# Patient Record
Sex: FEMALE | Race: WHITE | NOT HISPANIC OR LATINO | Employment: PART TIME | URBAN - METROPOLITAN AREA
[De-identification: names, ages, dates, MRNs, and addresses within clinical notes are randomized per-mention and may not be internally consistent; named-entity substitution may affect disease eponyms.]

---
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
## 2019-07-22 ENCOUNTER — TELEPHONE (OUTPATIENT)
Dept: MEDICAL GROUP | Facility: MEDICAL CENTER | Age: 19
End: 2019-07-22

## 2019-07-22 DIAGNOSIS — E53.8 B12 DEFICIENCY: ICD-10-CM

## 2019-07-22 DIAGNOSIS — Z13.220 SCREENING FOR HYPERLIPIDEMIA: ICD-10-CM

## 2019-07-22 DIAGNOSIS — Z13.21 ENCOUNTER FOR VITAMIN DEFICIENCY SCREENING: ICD-10-CM

## 2019-07-22 DIAGNOSIS — D75.89 MACROCYTOSIS: ICD-10-CM

## 2019-07-22 DIAGNOSIS — Z13.89 SCREENING FOR MULTIPLE CONDITIONS: ICD-10-CM

## 2019-10-31 ENCOUNTER — OFFICE VISIT (OUTPATIENT)
Dept: MEDICAL GROUP | Facility: MEDICAL CENTER | Age: 19
End: 2019-10-31
Payer: COMMERCIAL

## 2019-10-31 VITALS
HEART RATE: 105 BPM | SYSTOLIC BLOOD PRESSURE: 102 MMHG | HEIGHT: 67 IN | DIASTOLIC BLOOD PRESSURE: 60 MMHG | OXYGEN SATURATION: 95 % | TEMPERATURE: 98.4 F | BODY MASS INDEX: 21.19 KG/M2 | WEIGHT: 135 LBS

## 2019-10-31 DIAGNOSIS — G43.109 MIGRAINE WITH AURA AND WITHOUT STATUS MIGRAINOSUS, NOT INTRACTABLE: ICD-10-CM

## 2019-10-31 DIAGNOSIS — F64.9 GENDER DYSPHORIA: ICD-10-CM

## 2019-10-31 DIAGNOSIS — N63.0 BREAST NODULE: ICD-10-CM

## 2019-10-31 PROCEDURE — 99214 OFFICE O/P EST MOD 30 MIN: CPT | Performed by: NURSE PRACTITIONER

## 2019-10-31 RX ORDER — SUMATRIPTAN 25 MG/1
25-50 TABLET, FILM COATED ORAL
Qty: 10 TAB | Refills: 1 | Status: SHIPPED | OUTPATIENT
Start: 2019-10-31 | End: 2022-07-13

## 2019-10-31 ASSESSMENT — PATIENT HEALTH QUESTIONNAIRE - PHQ9: CLINICAL INTERPRETATION OF PHQ2 SCORE: 0

## 2019-10-31 NOTE — PROGRESS NOTES
"  Subjective:     Rosalie Weber is a 19 y.o. female who presents with left breast nodule.    HPI:   Seen in f/u for left breast nodule.  Noted 2 mo ago.  No change in size.  Sl tender.  Pat gm wiht breast cancer.  Sister with unknown type of lump.  No nipple dg.    She is getting migraines 2-3 x/yr.  It is getting more freq.  Aura with vision fractures occurring right before pain starts.  That has always been there.    She is requesting referral to psychiatry to discuss gender dysphoria.  She is not on meds at this time.     Patient Active Problem List    Diagnosis Date Noted   • Chronic nonseasonal allergic rhinitis due to pollen    • Cholelithiasis    • B12 deficiency    • Migraine syndrome    • Depression with anxiety    • Vasovagal syncope 08/03/2016       Current medicines (including changes today)  Current Outpatient Medications   Medication Sig Dispense Refill   • SUMAtriptan (IMITREX) 25 MG Tab tablet Take 1-2 Tabs by mouth Once PRN for Migraine for up to 1 dose. 10 Tab 1   • Cyanocobalamin (VITAMIN B 12 PO) Take  by mouth.       No current facility-administered medications for this visit.        No Known Allergies    ROS  Constitutional: Negative. Negative for fever, chills, weight loss, malaise/fatigue and diaphoresis.   HENT: Negative. Negative for hearing loss, ear pain, nosebleeds, congestion, sore throat, neck pain, tinnitus and ear discharge.   Respiratory: Negative. Negative for cough, hemoptysis, sputum production, shortness of breath, wheezing and stridor.   Cardiovascular: Negative. Negative for chest pain, palpitations, orthopnea, claudication, leg swelling and PND.          Objective:     /60 (BP Location: Right arm, Patient Position: Sitting)   Pulse (!) 105   Temp 36.9 °C (98.4 °F) (Temporal)   Ht 1.702 m (5' 7\")   Wt 61.2 kg (135 lb)   SpO2 95%  Body mass index is 21.14 kg/m².    Physical Exam:  Vitals reviewed.  Constitutional: Oriented to person, place, and time. appears " well-developed and well-nourished. No distress.   Cardiovascular: Normal rate, regular rhythm, normal heart sounds and intact distal pulses. Exam reveals no gallop and no friction rub. No murmur heard. No carotid bruits.   Pulmonary/Chest: Effort normal and breath sounds normal. No stridor. No respiratory distress. no wheezes or rales. exhibits no tenderness.   Musculoskeletal: Normal range of motion. exhibits no edema. rudy pedal pulses 2+.  Lymphadenopathy: No cervical or supraclavicular adenopathy.   Neurological: Alert and oriented to person, place, and time. exhibits normal muscle tone.  Skin: Skin is warm and dry. No diaphoresis.   Psychiatric: Normal mood and affect. Behavior is normal.   rudy breast exam::  Equal in size.  No nipple dg.  Firm mildly tender movable nodule noted at 1300 left breast     Assessment and Plan:     The following treatment plan was discussed:    1. Breast nodule  US-BREAST LIMITED-LEFT    left breast nodule at 1300.  do breast us.  f/u with pt with results.    2. Migraine with aura and without status migrainosus, not intractable  SUMAtriptan (IMITREX) 25 MG Tab tablet    MR-BRAIN-W/O    try imitrex.  she will do MRI brain d/t aura if $$.  f/u if sx worsen or med not helping sx   3. Gender dysphoria  REFERRAL TO PSYCHIATRY    refer psychiatry         Followup: Return if symptoms worsen or fail to improve.

## 2020-10-16 ENCOUNTER — HOSPITAL ENCOUNTER (OUTPATIENT)
Dept: LAB | Facility: MEDICAL CENTER | Age: 20
End: 2020-10-16
Attending: FAMILY MEDICINE
Payer: COMMERCIAL

## 2020-10-16 LAB
ALBUMIN SERPL BCP-MCNC: 4.9 G/DL (ref 3.2–4.9)
ALBUMIN/GLOB SERPL: 1.5 G/DL
ALP SERPL-CCNC: 57 U/L (ref 30–99)
ALT SERPL-CCNC: 10 U/L (ref 2–50)
ANION GAP SERPL CALC-SCNC: 10 MMOL/L (ref 7–16)
AST SERPL-CCNC: 14 U/L (ref 12–45)
BASOPHILS # BLD AUTO: 0.4 % (ref 0–1.8)
BASOPHILS # BLD: 0.02 K/UL (ref 0–0.12)
BILIRUB SERPL-MCNC: 0.7 MG/DL (ref 0.1–1.5)
BUN SERPL-MCNC: 6 MG/DL (ref 8–22)
CALCIUM SERPL-MCNC: 9.6 MG/DL (ref 8.5–10.5)
CHLORIDE SERPL-SCNC: 102 MMOL/L (ref 96–112)
CHOLEST SERPL-MCNC: 142 MG/DL (ref 100–199)
CO2 SERPL-SCNC: 23 MMOL/L (ref 20–33)
CREAT SERPL-MCNC: 0.63 MG/DL (ref 0.5–1.4)
EOSINOPHIL # BLD AUTO: 0.16 K/UL (ref 0–0.51)
EOSINOPHIL NFR BLD: 3 % (ref 0–6.9)
ERYTHROCYTE [DISTWIDTH] IN BLOOD BY AUTOMATED COUNT: 37.5 FL (ref 35.9–50)
GLOBULIN SER CALC-MCNC: 3.3 G/DL (ref 1.9–3.5)
GLUCOSE SERPL-MCNC: 88 MG/DL (ref 65–99)
HCT VFR BLD AUTO: 44.1 % (ref 37–47)
HDLC SERPL-MCNC: 49 MG/DL
HGB BLD-MCNC: 14.8 G/DL (ref 12–16)
IMM GRANULOCYTES # BLD AUTO: 0.02 K/UL (ref 0–0.11)
IMM GRANULOCYTES NFR BLD AUTO: 0.4 % (ref 0–0.9)
IRON SATN MFR SERPL: 61 % (ref 15–55)
IRON SERPL-MCNC: 207 UG/DL (ref 40–170)
LDLC SERPL CALC-MCNC: 81 MG/DL
LYMPHOCYTES # BLD AUTO: 1.27 K/UL (ref 1–4.8)
LYMPHOCYTES NFR BLD: 23.6 % (ref 22–41)
MCH RBC QN AUTO: 30.3 PG (ref 27–33)
MCHC RBC AUTO-ENTMCNC: 33.6 G/DL (ref 33.6–35)
MCV RBC AUTO: 90.4 FL (ref 81.4–97.8)
MONOCYTES # BLD AUTO: 0.48 K/UL (ref 0–0.85)
MONOCYTES NFR BLD AUTO: 8.9 % (ref 0–13.4)
NEUTROPHILS # BLD AUTO: 3.42 K/UL (ref 2–7.15)
NEUTROPHILS NFR BLD: 63.7 % (ref 44–72)
NRBC # BLD AUTO: 0 K/UL
NRBC BLD-RTO: 0 /100 WBC
PLATELET # BLD AUTO: 213 K/UL (ref 164–446)
PMV BLD AUTO: 10.1 FL (ref 9–12.9)
POTASSIUM SERPL-SCNC: 3.9 MMOL/L (ref 3.6–5.5)
PROT SERPL-MCNC: 8.2 G/DL (ref 6–8.2)
RBC # BLD AUTO: 4.88 M/UL (ref 4.2–5.4)
SODIUM SERPL-SCNC: 135 MMOL/L (ref 135–145)
TIBC SERPL-MCNC: 338 UG/DL (ref 250–450)
TRIGL SERPL-MCNC: 60 MG/DL (ref 0–149)
UIBC SERPL-MCNC: 131 UG/DL (ref 110–370)
WBC # BLD AUTO: 5.4 K/UL (ref 4.8–10.8)

## 2020-10-16 PROCEDURE — 84144 ASSAY OF PROGESTERONE: CPT

## 2020-10-16 PROCEDURE — 84403 ASSAY OF TOTAL TESTOSTERONE: CPT

## 2020-10-16 PROCEDURE — 83540 ASSAY OF IRON: CPT

## 2020-10-16 PROCEDURE — 83036 HEMOGLOBIN GLYCOSYLATED A1C: CPT

## 2020-10-16 PROCEDURE — 82607 VITAMIN B-12: CPT

## 2020-10-16 PROCEDURE — 84402 ASSAY OF FREE TESTOSTERONE: CPT

## 2020-10-16 PROCEDURE — 82670 ASSAY OF TOTAL ESTRADIOL: CPT

## 2020-10-16 PROCEDURE — 83550 IRON BINDING TEST: CPT

## 2020-10-16 PROCEDURE — 82306 VITAMIN D 25 HYDROXY: CPT

## 2020-10-16 PROCEDURE — 36415 COLL VENOUS BLD VENIPUNCTURE: CPT

## 2020-10-16 PROCEDURE — 84270 ASSAY OF SEX HORMONE GLOBUL: CPT

## 2020-10-16 PROCEDURE — 80061 LIPID PANEL: CPT

## 2020-10-16 PROCEDURE — 80053 COMPREHEN METABOLIC PANEL: CPT

## 2020-10-16 PROCEDURE — 84443 ASSAY THYROID STIM HORMONE: CPT

## 2020-10-16 PROCEDURE — 85025 COMPLETE CBC W/AUTO DIFF WBC: CPT

## 2020-10-17 LAB
25(OH)D3 SERPL-MCNC: 16 NG/ML (ref 30–100)
EST. AVERAGE GLUCOSE BLD GHB EST-MCNC: 97 MG/DL
ESTRADIOL SERPL-MCNC: 116 PG/ML
HBA1C MFR BLD: 5 % (ref 0–5.6)
PROGEST SERPL-MCNC: 14.4 NG/ML
TSH SERPL DL<=0.005 MIU/L-ACNC: 1.24 UIU/ML (ref 0.38–5.33)
VIT B12 SERPL-MCNC: 1069 PG/ML (ref 211–911)

## 2020-10-22 LAB
SHBG SERPL-SCNC: 28 NMOL/L (ref 30–135)
TESTOST FREE SERPL-MCNC: 4.5 PG/ML (ref 0.8–7.4)
TESTOST SERPL-MCNC: 25 NG/DL (ref 9–55)

## 2021-02-23 ENCOUNTER — HOSPITAL ENCOUNTER (OUTPATIENT)
Dept: LAB | Facility: MEDICAL CENTER | Age: 21
End: 2021-02-23
Attending: FAMILY MEDICINE
Payer: COMMERCIAL

## 2021-02-23 LAB
25(OH)D3 SERPL-MCNC: 45 NG/ML (ref 30–100)
ALBUMIN SERPL BCP-MCNC: 4.5 G/DL (ref 3.2–4.9)
ALBUMIN/GLOB SERPL: 1.5 G/DL
ALP SERPL-CCNC: 60 U/L (ref 30–99)
ALT SERPL-CCNC: 7 U/L (ref 2–50)
ANION GAP SERPL CALC-SCNC: 10 MMOL/L (ref 7–16)
AST SERPL-CCNC: 15 U/L (ref 12–45)
BILIRUB SERPL-MCNC: 0.5 MG/DL (ref 0.1–1.5)
BUN SERPL-MCNC: 5 MG/DL (ref 8–22)
CALCIUM SERPL-MCNC: 9.5 MG/DL (ref 8.5–10.5)
CHLORIDE SERPL-SCNC: 103 MMOL/L (ref 96–112)
CO2 SERPL-SCNC: 27 MMOL/L (ref 20–33)
CREAT SERPL-MCNC: 0.71 MG/DL (ref 0.5–1.4)
ERYTHROCYTE [DISTWIDTH] IN BLOOD BY AUTOMATED COUNT: 39 FL (ref 35.9–50)
ESTRADIOL SERPL-MCNC: 21.4 PG/ML
GLOBULIN SER CALC-MCNC: 3 G/DL (ref 1.9–3.5)
GLUCOSE SERPL-MCNC: 89 MG/DL (ref 65–99)
HCT VFR BLD AUTO: 47.2 % (ref 37–47)
HGB BLD-MCNC: 15.6 G/DL (ref 12–16)
IRON SATN MFR SERPL: 22 % (ref 15–55)
IRON SERPL-MCNC: 80 UG/DL (ref 40–170)
MCH RBC QN AUTO: 29.8 PG (ref 27–33)
MCHC RBC AUTO-ENTMCNC: 33.1 G/DL (ref 33.6–35)
MCV RBC AUTO: 90.1 FL (ref 81.4–97.8)
PLATELET # BLD AUTO: 231 K/UL (ref 164–446)
PMV BLD AUTO: 9.6 FL (ref 9–12.9)
POTASSIUM SERPL-SCNC: 4.1 MMOL/L (ref 3.6–5.5)
PROT SERPL-MCNC: 7.5 G/DL (ref 6–8.2)
RBC # BLD AUTO: 5.24 M/UL (ref 4.2–5.4)
SODIUM SERPL-SCNC: 140 MMOL/L (ref 135–145)
TESTOST SERPL-MCNC: 517 NG/DL (ref 9–75)
TIBC SERPL-MCNC: 356 UG/DL (ref 250–450)
UIBC SERPL-MCNC: 276 UG/DL (ref 110–370)
WBC # BLD AUTO: 4.5 K/UL (ref 4.8–10.8)

## 2021-02-23 PROCEDURE — 36415 COLL VENOUS BLD VENIPUNCTURE: CPT

## 2021-02-23 PROCEDURE — 83540 ASSAY OF IRON: CPT

## 2021-02-23 PROCEDURE — 82670 ASSAY OF TOTAL ESTRADIOL: CPT

## 2021-02-23 PROCEDURE — 84403 ASSAY OF TOTAL TESTOSTERONE: CPT

## 2021-02-23 PROCEDURE — 83550 IRON BINDING TEST: CPT

## 2021-02-23 PROCEDURE — 85027 COMPLETE CBC AUTOMATED: CPT

## 2021-02-23 PROCEDURE — 82306 VITAMIN D 25 HYDROXY: CPT

## 2021-02-23 PROCEDURE — 80053 COMPREHEN METABOLIC PANEL: CPT

## 2021-10-09 ENCOUNTER — OFFICE VISIT (OUTPATIENT)
Dept: URGENT CARE | Facility: PHYSICIAN GROUP | Age: 21
End: 2021-10-09
Payer: COMMERCIAL

## 2021-10-09 VITALS
DIASTOLIC BLOOD PRESSURE: 70 MMHG | RESPIRATION RATE: 12 BRPM | BODY MASS INDEX: 22.76 KG/M2 | HEART RATE: 77 BPM | TEMPERATURE: 98.9 F | SYSTOLIC BLOOD PRESSURE: 114 MMHG | WEIGHT: 145 LBS | HEIGHT: 67 IN | OXYGEN SATURATION: 98 %

## 2021-10-09 DIAGNOSIS — L08.9 INFECTED FINGER: ICD-10-CM

## 2021-10-09 PROCEDURE — 99213 OFFICE O/P EST LOW 20 MIN: CPT | Performed by: PHYSICIAN ASSISTANT

## 2021-10-09 RX ORDER — ERGOCALCIFEROL 1.25 MG/1
CAPSULE ORAL
COMMUNITY
Start: 2021-09-10 | End: 2022-07-13

## 2021-10-09 RX ORDER — SULFAMETHOXAZOLE AND TRIMETHOPRIM 800; 160 MG/1; MG/1
1 TABLET ORAL 2 TIMES DAILY
Qty: 14 TABLET | Refills: 0 | Status: SHIPPED | OUTPATIENT
Start: 2021-10-09 | End: 2021-10-16

## 2021-10-09 ASSESSMENT — ENCOUNTER SYMPTOMS
BLURRED VISION: 0
SENSORY CHANGE: 0
CHILLS: 0
NAUSEA: 0
FEVER: 0
VOMITING: 0
SORE THROAT: 0
PALPITATIONS: 0
SHORTNESS OF BREATH: 0
TINGLING: 0

## 2021-10-09 ASSESSMENT — FIBROSIS 4 INDEX: FIB4 SCORE: 0.56

## 2021-10-09 NOTE — PROGRESS NOTES
Subjective     Rosalie Tijerina is a 21 y.o. female who presents with Hand Problem (x1week, right hand middle finger infection, puss , painfully )    HPI:  Rosalie Tijerina is a 21 y.o. female who presents today for evaluation of a possible infection of her right middle finger.  Patient reports that she had areas of discomfort 1 week ago.  It has been draining purulent material and has become very tender to touch.  She is also noted some mild overlying redness and swelling.  She tried keeping it clean with alcohol swabs but has not tried much else for it.  She denies any numbness or tingling.  Denies any injury.  No fever/chills.      Review of Systems   Constitutional: Negative for chills and fever.   HENT: Negative for sore throat.    Eyes: Negative for blurred vision.   Respiratory: Negative for shortness of breath.    Cardiovascular: Negative for chest pain and palpitations.   Gastrointestinal: Negative for nausea and vomiting.   Musculoskeletal: Negative for joint pain.   Skin:        Infected right finger   Neurological: Negative for tingling and sensory change.         PMH:  has a past medical history of B12 deficiency, Cholelithiasis, Chronic nonseasonal allergic rhinitis due to pollen, Depression with anxiety, Migraine syndrome, and Vasovagal syncope (8/3/2016).  MEDS:   Current Outpatient Medications:   •  SUMAtriptan (IMITREX) 25 MG Tab tablet, Take 1-2 Tabs by mouth Once PRN for Migraine for up to 1 dose., Disp: 10 Tab, Rfl: 1  •  Cyanocobalamin (VITAMIN B 12 PO), Take  by mouth., Disp: , Rfl:   •  vitamin D2, Ergocalciferol, (DRISDOL) 1.25 MG (49305 UT) Cap capsule, , Disp: , Rfl:   ALLERGIES: No Known Allergies  SURGHX:   Past Surgical History:   Procedure Laterality Date   • APPENDECTOMY  02/2009     SOCHX:  reports that she has never smoked. She has never used smokeless tobacco. She reports current alcohol use. She reports that she does not use drugs.  FH: Family  "history was reviewed, no pertinent findings to report      Objective     /70 (BP Location: Right arm, Patient Position: Sitting, BP Cuff Size: Adult)   Pulse 77   Temp 37.2 °C (98.9 °F) (Temporal)   Resp 12   Ht 1.702 m (5' 7\")   Wt 65.8 kg (145 lb)   SpO2 98%   BMI 22.71 kg/m²      Physical Exam  Constitutional:       Appearance: She is well-developed.   HENT:      Head: Normocephalic and atraumatic.      Right Ear: External ear normal.      Left Ear: External ear normal.   Eyes:      Conjunctiva/sclera: Conjunctivae normal.      Pupils: Pupils are equal, round, and reactive to light.   Cardiovascular:      Rate and Rhythm: Normal rate and regular rhythm.      Heart sounds: Normal heart sounds. No murmur heard.     Pulmonary:      Effort: Pulmonary effort is normal.      Breath sounds: Normal breath sounds. No wheezing.   Musculoskeletal:      Comments: Right middle finger: Ulnar aspect of nailbed exhibits overlying erythema, soft tissue swelling, and some purulent discharge noted.  It is tender to palpation.  No fluctuance.  No decreased range of motion of the affected digit.  Distal neurovascular intact.  Cap refill less than 2 seconds.   Skin:     General: Skin is warm and dry.      Capillary Refill: Capillary refill takes less than 2 seconds.   Neurological:      Mental Status: She is alert and oriented to person, place, and time.   Psychiatric:         Behavior: Behavior normal.         Judgment: Judgment normal.           Assessment & Plan     1. Infected finger  - sulfamethoxazole-trimethoprim (BACTRIM DS) 800-160 MG tablet; Take 1 Tablet by mouth 2 times a day for 7 days.  Dispense: 14 Tablet; Refill: 0  No abscess noted that is amenable to drainage.  Patient will be started on antibiotics.  She was also advised to do soaks with warm water and Epson salt multiple times per day.  The wound was cleaned in the office and a dressing of Polysporin and Band-Aid was applied.  It was recommended that " she keep the area covered until the infection starts to clear up a bit and it is no longer draining purulent material.  If no significant improvement after 3 to 4 days being on antibiotics or if symptom significantly worsen during this time she should return to the urgent care for reevaluation.                Differential Diagnosis, natural history, and supportive care discussed. Return to the Urgent Care or follow up with your PCP if symptoms fail to resolve, or for any new or worsening symptoms. Emergency room precautions discussed. Patient and/or family appears understanding of information.

## 2022-07-01 ENCOUNTER — TELEPHONE (OUTPATIENT)
Dept: SCHEDULING | Facility: IMAGING CENTER | Age: 22
End: 2022-07-01

## 2022-07-13 ENCOUNTER — OFFICE VISIT (OUTPATIENT)
Dept: MEDICAL GROUP | Facility: MEDICAL CENTER | Age: 22
End: 2022-07-13
Payer: COMMERCIAL

## 2022-07-13 VITALS
OXYGEN SATURATION: 95 % | TEMPERATURE: 98.8 F | HEIGHT: 67 IN | HEART RATE: 61 BPM | SYSTOLIC BLOOD PRESSURE: 108 MMHG | DIASTOLIC BLOOD PRESSURE: 72 MMHG | BODY MASS INDEX: 21.72 KG/M2 | WEIGHT: 138.4 LBS

## 2022-07-13 DIAGNOSIS — Z91.89 AT RISK FOR BREAST CANCER: ICD-10-CM

## 2022-07-13 DIAGNOSIS — K80.20 CALCULUS OF GALLBLADDER WITHOUT CHOLECYSTITIS WITHOUT OBSTRUCTION: ICD-10-CM

## 2022-07-13 DIAGNOSIS — Z23 NEED FOR VACCINATION: ICD-10-CM

## 2022-07-13 DIAGNOSIS — E53.8 B12 DEFICIENCY: ICD-10-CM

## 2022-07-13 DIAGNOSIS — Z13.220 LIPID SCREENING: ICD-10-CM

## 2022-07-13 DIAGNOSIS — G43.909 MIGRAINE SYNDROME: ICD-10-CM

## 2022-07-13 DIAGNOSIS — E55.9 VITAMIN D DEFICIENCY: ICD-10-CM

## 2022-07-13 PROCEDURE — 99214 OFFICE O/P EST MOD 30 MIN: CPT | Mod: 25 | Performed by: FAMILY MEDICINE

## 2022-07-13 PROCEDURE — 90471 IMMUNIZATION ADMIN: CPT | Performed by: FAMILY MEDICINE

## 2022-07-13 PROCEDURE — 90651 9VHPV VACCINE 2/3 DOSE IM: CPT | Performed by: FAMILY MEDICINE

## 2022-07-13 RX ORDER — RIZATRIPTAN BENZOATE 5 MG/1
5 TABLET ORAL
Qty: 10 TABLET | Refills: 3 | Status: SHIPPED | OUTPATIENT
Start: 2022-07-13 | End: 2023-04-28

## 2022-07-13 SDOH — HEALTH STABILITY: PHYSICAL HEALTH: ON AVERAGE, HOW MANY DAYS PER WEEK DO YOU ENGAGE IN MODERATE TO STRENUOUS EXERCISE (LIKE A BRISK WALK)?: 5 DAYS

## 2022-07-13 SDOH — HEALTH STABILITY: MENTAL HEALTH
STRESS IS WHEN SOMEONE FEELS TENSE, NERVOUS, ANXIOUS, OR CAN'T SLEEP AT NIGHT BECAUSE THEIR MIND IS TROUBLED. HOW STRESSED ARE YOU?: TO SOME EXTENT

## 2022-07-13 SDOH — ECONOMIC STABILITY: HOUSING INSECURITY
IN THE LAST 12 MONTHS, WAS THERE A TIME WHEN YOU DID NOT HAVE A STEADY PLACE TO SLEEP OR SLEPT IN A SHELTER (INCLUDING NOW)?: NO

## 2022-07-13 SDOH — ECONOMIC STABILITY: INCOME INSECURITY: IN THE LAST 12 MONTHS, WAS THERE A TIME WHEN YOU WERE NOT ABLE TO PAY THE MORTGAGE OR RENT ON TIME?: NO

## 2022-07-13 SDOH — ECONOMIC STABILITY: HOUSING INSECURITY: IN THE LAST 12 MONTHS, HOW MANY PLACES HAVE YOU LIVED?: 1

## 2022-07-13 SDOH — ECONOMIC STABILITY: INCOME INSECURITY: HOW HARD IS IT FOR YOU TO PAY FOR THE VERY BASICS LIKE FOOD, HOUSING, MEDICAL CARE, AND HEATING?: SOMEWHAT HARD

## 2022-07-13 SDOH — HEALTH STABILITY: PHYSICAL HEALTH: ON AVERAGE, HOW MANY MINUTES DO YOU ENGAGE IN EXERCISE AT THIS LEVEL?: 30 MIN

## 2022-07-13 SDOH — ECONOMIC STABILITY: TRANSPORTATION INSECURITY
IN THE PAST 12 MONTHS, HAS LACK OF RELIABLE TRANSPORTATION KEPT YOU FROM MEDICAL APPOINTMENTS, MEETINGS, WORK OR FROM GETTING THINGS NEEDED FOR DAILY LIVING?: NO

## 2022-07-13 SDOH — ECONOMIC STABILITY: FOOD INSECURITY: WITHIN THE PAST 12 MONTHS, YOU WORRIED THAT YOUR FOOD WOULD RUN OUT BEFORE YOU GOT MONEY TO BUY MORE.: NEVER TRUE

## 2022-07-13 SDOH — ECONOMIC STABILITY: TRANSPORTATION INSECURITY
IN THE PAST 12 MONTHS, HAS THE LACK OF TRANSPORTATION KEPT YOU FROM MEDICAL APPOINTMENTS OR FROM GETTING MEDICATIONS?: NO

## 2022-07-13 SDOH — ECONOMIC STABILITY: TRANSPORTATION INSECURITY
IN THE PAST 12 MONTHS, HAS LACK OF TRANSPORTATION KEPT YOU FROM MEETINGS, WORK, OR FROM GETTING THINGS NEEDED FOR DAILY LIVING?: NO

## 2022-07-13 SDOH — ECONOMIC STABILITY: FOOD INSECURITY: WITHIN THE PAST 12 MONTHS, THE FOOD YOU BOUGHT JUST DIDN'T LAST AND YOU DIDN'T HAVE MONEY TO GET MORE.: NEVER TRUE

## 2022-07-13 ASSESSMENT — LIFESTYLE VARIABLES
HOW OFTEN DO YOU HAVE A DRINK CONTAINING ALCOHOL: MONTHLY OR LESS
HOW MANY STANDARD DRINKS CONTAINING ALCOHOL DO YOU HAVE ON A TYPICAL DAY: 1 OR 2
AUDIT-C TOTAL SCORE: 2
HOW OFTEN DO YOU HAVE SIX OR MORE DRINKS ON ONE OCCASION: LESS THAN MONTHLY
SKIP TO QUESTIONS 9-10: 0

## 2022-07-13 ASSESSMENT — SOCIAL DETERMINANTS OF HEALTH (SDOH)
IN A TYPICAL WEEK, HOW MANY TIMES DO YOU TALK ON THE PHONE WITH FAMILY, FRIENDS, OR NEIGHBORS?: ONCE A WEEK
HOW MANY DRINKS CONTAINING ALCOHOL DO YOU HAVE ON A TYPICAL DAY WHEN YOU ARE DRINKING: 1 OR 2
DO YOU BELONG TO ANY CLUBS OR ORGANIZATIONS SUCH AS CHURCH GROUPS UNIONS, FRATERNAL OR ATHLETIC GROUPS, OR SCHOOL GROUPS?: NO
ARE YOU MARRIED, WIDOWED, DIVORCED, SEPARATED, NEVER MARRIED, OR LIVING WITH A PARTNER?: LIVING WITH PARTNER
ARE YOU MARRIED, WIDOWED, DIVORCED, SEPARATED, NEVER MARRIED, OR LIVING WITH A PARTNER?: LIVING WITH PARTNER
HOW OFTEN DO YOU ATTENT MEETINGS OF THE CLUB OR ORGANIZATION YOU BELONG TO?: PATIENT DECLINED
WITHIN THE PAST 12 MONTHS, YOU WORRIED THAT YOUR FOOD WOULD RUN OUT BEFORE YOU GOT THE MONEY TO BUY MORE: NEVER TRUE
HOW HARD IS IT FOR YOU TO PAY FOR THE VERY BASICS LIKE FOOD, HOUSING, MEDICAL CARE, AND HEATING?: SOMEWHAT HARD
HOW OFTEN DO YOU ATTENT MEETINGS OF THE CLUB OR ORGANIZATION YOU BELONG TO?: PATIENT DECLINED
HOW OFTEN DO YOU ATTEND CHURCH OR RELIGIOUS SERVICES?: NEVER
IN A TYPICAL WEEK, HOW MANY TIMES DO YOU TALK ON THE PHONE WITH FAMILY, FRIENDS, OR NEIGHBORS?: ONCE A WEEK
HOW OFTEN DO YOU GET TOGETHER WITH FRIENDS OR RELATIVES?: THREE TIMES A WEEK
HOW OFTEN DO YOU HAVE A DRINK CONTAINING ALCOHOL: MONTHLY OR LESS
HOW OFTEN DO YOU GET TOGETHER WITH FRIENDS OR RELATIVES?: THREE TIMES A WEEK
HOW OFTEN DO YOU ATTEND CHURCH OR RELIGIOUS SERVICES?: NEVER
DO YOU BELONG TO ANY CLUBS OR ORGANIZATIONS SUCH AS CHURCH GROUPS UNIONS, FRATERNAL OR ATHLETIC GROUPS, OR SCHOOL GROUPS?: NO
HOW OFTEN DO YOU HAVE SIX OR MORE DRINKS ON ONE OCCASION: LESS THAN MONTHLY

## 2022-07-13 ASSESSMENT — FIBROSIS 4 INDEX: FIB4 SCORE: 0.59

## 2022-07-13 ASSESSMENT — PATIENT HEALTH QUESTIONNAIRE - PHQ9: CLINICAL INTERPRETATION OF PHQ2 SCORE: 0

## 2022-07-13 NOTE — PROGRESS NOTES
"Subjective:     CC:  Diagnoses of Migraine syndrome, At risk for breast cancer, Calculus of gallbladder without cholecystitis without obstruction, Lipid screening, B12 deficiency, Vitamin D deficiency, and Need for vaccination were pertinent to this visit.    HISTORY OF THE PRESENT ILLNESS: Patient is a 22 y.o. female. This pleasant patient is here today to establish care and discuss migraines.     Has previously on HRT, for body dysphoria  - was taking testosterone 0.5 ml Q2 weeks ~50 mg  - previous PCP managed this  - Patient denies any side effects with this  - Patient is interested in restarting this medication    Migraines:  - first started a few years ago  - frequency increased  - last 6 months has them 1-2 a month, last couple of weeks has had 1-2 per week  - medicines have not been helpful  - headache starts with aura that is described as a fractured vision (like a crack in a computer screen), usually has about 1/2 hour and then pain, can be debilitating, endorses vomiting sometimes, no known triggers, maybe stress and decreased sleep, headache is usually frontal, photophobia and phonophobia, no nose drainage, sometimes can have tearing. Treat it with rest and darkness.  - has tried Tylenol, IBP, Sumatriptan and Nurtec without success    Problem   Vitamin D Deficiency   Migraine Syndrome       Current Outpatient Medications Ordered in Epic   Medication Sig Dispense Refill   • rizatriptan (MAXALT) 5 MG tablet Take 1 Tablet by mouth one time as needed for Migraine for up to 1 dose. 10 Tablet 3     No current Epic-ordered facility-administered medications on file.       Health Maintenance: HPV vaccine today, discussed other vaccines, encourage Pap smear appointment    ROS:   ROS see HPI      Objective:       Exam: /72   Pulse 61   Temp 37.1 °C (98.8 °F) (Temporal)   Ht 1.702 m (5' 7\")   Wt 62.8 kg (138 lb 6.4 oz)   SpO2 95%  Body mass index is 21.68 kg/m².    Physical Exam  Vitals reviewed. "   Constitutional:       General: She is not in acute distress.     Appearance: Normal appearance.   HENT:      Head: Normocephalic and atraumatic.   Cardiovascular:      Rate and Rhythm: Normal rate and regular rhythm.      Heart sounds: Normal heart sounds.   Pulmonary:      Effort: Pulmonary effort is normal.      Breath sounds: Normal breath sounds.   Neurological:      Mental Status: She is alert. Mental status is at baseline.   Psychiatric:         Mood and Affect: Mood normal.         Behavior: Behavior normal.           Assessment & Plan:   22 y.o. female with the following -    Problem List Items Addressed This Visit     Cholelithiasis    Relevant Orders    Comp Metabolic Panel    B12 deficiency    Relevant Orders    VITAMIN B12    Migraine syndrome     We will do a trial of Maxalt to see if that works better than the sumatriptan.  Provided information regarding over-the-counter supplements that can be migraine prophylactic.  Discussed that there can be prophylactic medication that we can try and referral to headache clinic if need be as well.           Relevant Medications    rizatriptan (MAXALT) 5 MG tablet    Other Relevant Orders    CBC WITHOUT DIFFERENTIAL    TSH WITH REFLEX TO FT4    Vitamin D deficiency    Relevant Orders    VITAMIN D,25 HYDROXY      Other Visit Diagnoses     At risk for breast cancer        Relevant Orders    Referral to Genetic Research Studies    Lipid screening        Relevant Orders    Lipid Profile    Need for vaccination        Relevant Orders    Gardasil 9 (Completed)          Return in about 4 weeks (around 8/10/2022) for Lab F/U, Medication F/U.    Please note that this dictation was created using voice recognition software. I have made every reasonable attempt to correct obvious errors, but I expect that there are errors of grammar and possibly content that I did not discover before finalizing the note.

## 2022-07-13 NOTE — PATIENT INSTRUCTIONS
Here are the supplements I recommend for migraine prophylaxis:  - Riboflavin 400 mg daily  - CoQ-10 100 mg twice daily  - Magnesium citrate 600 mg daily     Maxalt can be taken as 5-10 mg for headache , please don't take more than 20 mg in a 24 hour period

## 2022-07-13 NOTE — ASSESSMENT & PLAN NOTE
We will do a trial of Maxalt to see if that works better than the sumatriptan.  Provided information regarding over-the-counter supplements that can be migraine prophylactic.  Discussed that there can be prophylactic medication that we can try and referral to headache clinic if need be as well.

## 2022-07-20 ENCOUNTER — RESEARCH ENCOUNTER (OUTPATIENT)
Dept: RESEARCH | Facility: WORKSITE | Age: 22
End: 2022-07-20
Payer: COMMERCIAL

## 2022-07-20 DIAGNOSIS — Z00.6 RESEARCH STUDY PATIENT: Primary | ICD-10-CM

## 2022-08-29 LAB
APOB+LDLR+PCSK9 GENE MUT ANL BLD/T: NOT DETECTED
BRCA1+BRCA2 DEL+DUP + FULL MUT ANL BLD/T: NOT DETECTED
MLH1+MSH2+MSH6+PMS2 GN DEL+DUP+FUL M: NOT DETECTED

## 2022-08-31 ENCOUNTER — OFFICE VISIT (OUTPATIENT)
Dept: MEDICAL GROUP | Facility: MEDICAL CENTER | Age: 22
End: 2022-08-31
Payer: COMMERCIAL

## 2022-08-31 VITALS
WEIGHT: 138.4 LBS | BODY MASS INDEX: 21.72 KG/M2 | OXYGEN SATURATION: 99 % | TEMPERATURE: 98.6 F | DIASTOLIC BLOOD PRESSURE: 70 MMHG | HEART RATE: 81 BPM | SYSTOLIC BLOOD PRESSURE: 122 MMHG | HEIGHT: 67 IN

## 2022-08-31 DIAGNOSIS — F41.8 DEPRESSION WITH ANXIETY: ICD-10-CM

## 2022-08-31 DIAGNOSIS — Z79.890 HORMONE REPLACEMENT THERAPY (HRT): ICD-10-CM

## 2022-08-31 DIAGNOSIS — F98.8 ATTENTION DEFICIT DISORDER (ADD) IN ADULT: ICD-10-CM

## 2022-08-31 DIAGNOSIS — G43.909 MIGRAINE SYNDROME: ICD-10-CM

## 2022-08-31 DIAGNOSIS — E55.9 VITAMIN D DEFICIENCY: ICD-10-CM

## 2022-08-31 PROCEDURE — 99214 OFFICE O/P EST MOD 30 MIN: CPT | Performed by: FAMILY MEDICINE

## 2022-08-31 RX ORDER — TESTOSTERONE CYPIONATE 200 MG/ML
50 INJECTION, SOLUTION INTRAMUSCULAR ONCE
Qty: 3 ML | Refills: 0 | Status: SHIPPED | OUTPATIENT
Start: 2022-08-31 | End: 2022-08-31

## 2022-08-31 ASSESSMENT — FIBROSIS 4 INDEX: FIB4 SCORE: 0.59

## 2022-08-31 NOTE — ASSESSMENT & PLAN NOTE
Vitamin D has been low in the past and continues to be so.  Patient to take vitamin D3 over-the-counter supplementation.  Discussed could do 5000 units daily for a couple months and then stabilize dosing of 1 to 2000 units daily.

## 2022-08-31 NOTE — ASSESSMENT & PLAN NOTE
Patient previously on testosterone therapy which was helpful for variety reasons without concerning side effects or noted changes in labs.  Baseline labs obtained.  PDMP reviewed.  Will restart at 50 mcg q. 2 weeks and follow-up in 3 months with labs and discussion on how that was working.

## 2022-08-31 NOTE — PROGRESS NOTES
"Subjective:     CC: \"Lab follow up and Migraine medicine\"    HPI:   Rosalie presents today with:    Maxalt working better  Takes them when she gets pain and seems to work rather than waiting for aura  Aura starts with vision changes like the back of a CD and will expand and then slowly goes away, 10-30 minutes before headache starts  Hasn't had migraine in last few weeks  Has used it twice since mid-July    Hormone Replacement Therapy:  - patient previously on 50 mcg of Testosterone Replacement Therapy  - reports feeling more confident on medication and more inline with personal identity   - reports no issues with polycythemia or elevated LFTs previously  - recent labs WNL    Vit D insufficiency  - Vit D was 25 on recent lab  - not currently taking supplementation    ADD:  - patient reported being diagnosed previously as an adult by therapist and PCP  - no formal diagnosis as a child  - reports difficulty in class work, home life and work life with concentration      Problem   Hormone Replacement Therapy (Hrt)   Attention Deficit Disorder (Add) in Adult   Vitamin D Deficiency   Migraine Syndrome   Depression With Anxiety    Has occas sx.  She tries to be active and get plenty of sleep.  No suicidal ideation.  No tx.       Cholelithiasis (Resolved)    no current sx.  Has a hx of these but no sx.  Still has gallbladder. They were dx in elementary school.     B12 Deficiency (Resolved)    She doesn't remember when this was dx.  But was dx sometimes in last year.  Supposed to take b12 otc daily but forgets sometimes      Vasovagal Syncope (Resolved)    She passed out last year.  Had a echo and w/u.  Echo in computer is wnl but she was told there was a kink in her aorta.  Has low bp normally.          Current Outpatient Medications Ordered in Epic   Medication Sig Dispense Refill    testosterone cypionate (DEPO-TESTOSTERONE) 200 MG/ML Solution injection Inject 0.25 mL into the shoulder, thigh, or buttocks one time for 1 " "dose. Take 0.25 ml every 2 weeks IM 3 mL 0    rizatriptan (MAXALT) 5 MG tablet Take 1 Tablet by mouth one time as needed for Migraine for up to 1 dose. 10 Tablet 3     No current Epic-ordered facility-administered medications on file.       Health Maintenance: no vaccines for today    ROS:  ROS see HPI    Objective:     Exam:  /70   Pulse 81   Temp 37 °C (98.6 °F) (Temporal)   Ht 1.702 m (5' 7\")   Wt 62.8 kg (138 lb 6.4 oz)   SpO2 99%   BMI 21.68 kg/m²  Body mass index is 21.68 kg/m².    Physical Exam  Vitals reviewed.   Constitutional:       General: She is not in acute distress.     Appearance: Normal appearance.   HENT:      Head: Normocephalic and atraumatic.   Cardiovascular:      Rate and Rhythm: Normal rate and regular rhythm.      Heart sounds: Normal heart sounds.   Pulmonary:      Effort: Pulmonary effort is normal.      Breath sounds: Normal breath sounds.   Skin:     General: Skin is warm and dry.   Neurological:      Mental Status: She is alert. Mental status is at baseline.   Psychiatric:         Mood and Affect: Mood normal.         Behavior: Behavior normal.           Assessment & Plan:     22 y.o. female with the following -     Problem List Items Addressed This Visit       Migraine syndrome     Infrequent and Maxalt has worked great for  of migraines.  We will continue on current management and monitor for increase in headache frequency.         Depression with anxiety     Feels stable currently.  May be concomitant with ADD.  Referral to psychiatry.         Relevant Orders    Referral to Psychiatry    Vitamin D deficiency     Vitamin D has been low in the past and continues to be so.  Patient to take vitamin D3 over-the-counter supplementation.  Discussed could do 5000 units daily for a couple months and then stabilize dosing of 1 to 2000 units daily.         Hormone replacement therapy (HRT)     Patient previously on testosterone therapy which was helpful for variety reasons " without concerning side effects or noted changes in labs.  Baseline labs obtained.  PDMP reviewed.  Will restart at 50 mcg q. 2 weeks and follow-up in 3 months with labs and discussion on how that was working.         Relevant Medications    testosterone cypionate (DEPO-TESTOSTERONE) 200 MG/ML Solution injection    Other Relevant Orders    CBC WITHOUT DIFFERENTIAL    Comp Metabolic Panel    TESTOSTERONE SERUM    Attention deficit disorder (ADD) in adult     Patient does have difficulty with concentration.  Including having to take some time off from school work.  Parent initiated of diagnosis from counselor PCP previously.  We will try to get formalized testing from psychiatry.  Patient discussed with me if it is taking too long we are not able to do this and can consider trial of treatment from this office in the future.         Relevant Orders    Referral to Psychiatry       I spent a total of 32 minutes with record review, exam, communication with the patient, communication with other providers, and documentation of this encounter.      Return in about 3 months (around 11/30/2022), or if symptoms worsen or fail to improve, for Annual Visit.    Please note that this dictation was created using voice recognition software. I have made every reasonable attempt to correct obvious errors, but I expect that there are errors of grammar and possibly content that I did not discover before finalizing the note.

## 2022-08-31 NOTE — ASSESSMENT & PLAN NOTE
Infrequent and Maxalt has worked great for  of migraines.  We will continue on current management and monitor for increase in headache frequency.

## 2022-08-31 NOTE — ASSESSMENT & PLAN NOTE
Patient does have difficulty with concentration.  Including having to take some time off from school work.  Parent initiated of diagnosis from counselor PCP previously.  We will try to get formalized testing from psychiatry.  Patient discussed with me if it is taking too long we are not able to do this and can consider trial of treatment from this office in the future.

## 2022-10-12 ENCOUNTER — PATIENT MESSAGE (OUTPATIENT)
Dept: MEDICAL GROUP | Facility: MEDICAL CENTER | Age: 22
End: 2022-10-12
Payer: COMMERCIAL

## 2022-10-12 DIAGNOSIS — F41.8 DEPRESSION WITH ANXIETY: ICD-10-CM

## 2022-10-12 DIAGNOSIS — F98.8 ATTENTION DEFICIT DISORDER (ADD) IN ADULT: ICD-10-CM

## 2022-10-12 DIAGNOSIS — Z79.890 HORMONE REPLACEMENT THERAPY (HRT): ICD-10-CM

## 2022-10-14 RX ORDER — TESTOSTERONE CYPIONATE 200 MG/ML
50 INJECTION, SOLUTION INTRAMUSCULAR
Qty: 6 ML | Refills: 2 | Status: SHIPPED | OUTPATIENT
Start: 2022-10-14 | End: 2023-01-06

## 2022-10-14 NOTE — PROGRESS NOTES
Referral for counseling hopefully we get a place that takes patient's insurance and send in testosterone to new pharmacy.

## 2023-03-29 ENCOUNTER — HOSPITAL ENCOUNTER (OUTPATIENT)
Dept: LAB | Facility: MEDICAL CENTER | Age: 23
End: 2023-03-29
Attending: FAMILY MEDICINE
Payer: COMMERCIAL

## 2023-03-29 DIAGNOSIS — K80.20 CALCULUS OF GALLBLADDER WITHOUT CHOLECYSTITIS WITHOUT OBSTRUCTION: ICD-10-CM

## 2023-03-29 DIAGNOSIS — G43.909 MIGRAINE SYNDROME: ICD-10-CM

## 2023-03-29 DIAGNOSIS — E53.8 B12 DEFICIENCY: ICD-10-CM

## 2023-03-29 DIAGNOSIS — E55.9 VITAMIN D DEFICIENCY: ICD-10-CM

## 2023-03-29 DIAGNOSIS — Z13.220 LIPID SCREENING: ICD-10-CM

## 2023-03-29 LAB
ALBUMIN SERPL BCP-MCNC: 4.5 G/DL (ref 3.2–4.9)
ALBUMIN/GLOB SERPL: 1.5 G/DL
ALP SERPL-CCNC: 49 U/L (ref 30–99)
ALT SERPL-CCNC: 6 U/L (ref 2–50)
ANION GAP SERPL CALC-SCNC: 13 MMOL/L (ref 7–16)
AST SERPL-CCNC: 12 U/L (ref 12–45)
BILIRUB SERPL-MCNC: 0.3 MG/DL (ref 0.1–1.5)
BUN SERPL-MCNC: 9 MG/DL (ref 8–22)
CALCIUM ALBUM COR SERPL-MCNC: 8.7 MG/DL (ref 8.5–10.5)
CALCIUM SERPL-MCNC: 9.1 MG/DL (ref 8.5–10.5)
CHLORIDE SERPL-SCNC: 106 MMOL/L (ref 96–112)
CHOLEST SERPL-MCNC: 140 MG/DL (ref 100–199)
CO2 SERPL-SCNC: 21 MMOL/L (ref 20–33)
CREAT SERPL-MCNC: 0.73 MG/DL (ref 0.5–1.4)
GFR SERPLBLD CREATININE-BSD FMLA CKD-EPI: 117 ML/MIN/1.73 M 2
GLOBULIN SER CALC-MCNC: 3.1 G/DL (ref 1.9–3.5)
GLUCOSE SERPL-MCNC: 88 MG/DL (ref 65–99)
HDLC SERPL-MCNC: 47 MG/DL
LDLC SERPL CALC-MCNC: 86 MG/DL
POTASSIUM SERPL-SCNC: 4.3 MMOL/L (ref 3.6–5.5)
PROT SERPL-MCNC: 7.6 G/DL (ref 6–8.2)
SODIUM SERPL-SCNC: 140 MMOL/L (ref 135–145)
TRIGL SERPL-MCNC: 36 MG/DL (ref 0–149)

## 2023-03-29 PROCEDURE — 82306 VITAMIN D 25 HYDROXY: CPT

## 2023-03-29 PROCEDURE — 82607 VITAMIN B-12: CPT

## 2023-03-29 PROCEDURE — 80061 LIPID PANEL: CPT

## 2023-03-29 PROCEDURE — 84443 ASSAY THYROID STIM HORMONE: CPT

## 2023-03-29 PROCEDURE — 36415 COLL VENOUS BLD VENIPUNCTURE: CPT

## 2023-03-29 PROCEDURE — 80053 COMPREHEN METABOLIC PANEL: CPT

## 2023-03-30 LAB
25(OH)D3 SERPL-MCNC: 12 NG/ML (ref 30–100)
TSH SERPL DL<=0.005 MIU/L-ACNC: 0.91 UIU/ML (ref 0.38–5.33)
VIT B12 SERPL-MCNC: 363 PG/ML (ref 211–911)

## 2023-04-19 ENCOUNTER — APPOINTMENT (OUTPATIENT)
Dept: MEDICAL GROUP | Facility: MEDICAL CENTER | Age: 23
End: 2023-04-19
Payer: COMMERCIAL

## 2023-04-28 ENCOUNTER — OFFICE VISIT (OUTPATIENT)
Dept: MEDICAL GROUP | Facility: MEDICAL CENTER | Age: 23
End: 2023-04-28
Payer: COMMERCIAL

## 2023-04-28 VITALS
SYSTOLIC BLOOD PRESSURE: 114 MMHG | TEMPERATURE: 98.7 F | WEIGHT: 143.2 LBS | HEIGHT: 67 IN | BODY MASS INDEX: 22.47 KG/M2 | DIASTOLIC BLOOD PRESSURE: 68 MMHG | OXYGEN SATURATION: 99 % | HEART RATE: 85 BPM

## 2023-04-28 DIAGNOSIS — J30.89 CHRONIC NONSEASONAL ALLERGIC RHINITIS DUE TO POLLEN: ICD-10-CM

## 2023-04-28 DIAGNOSIS — E55.9 VITAMIN D DEFICIENCY: ICD-10-CM

## 2023-04-28 DIAGNOSIS — G43.909 MIGRAINE SYNDROME: ICD-10-CM

## 2023-04-28 DIAGNOSIS — Z79.890 HORMONE REPLACEMENT THERAPY (HRT): ICD-10-CM

## 2023-04-28 PROCEDURE — 99214 OFFICE O/P EST MOD 30 MIN: CPT | Performed by: FAMILY MEDICINE

## 2023-04-28 RX ORDER — ERGOCALCIFEROL 1.25 MG/1
50000 CAPSULE ORAL
Qty: 6 CAPSULE | Refills: 0 | Status: SHIPPED | OUTPATIENT
Start: 2023-04-28 | End: 2023-12-07 | Stop reason: SDUPTHER

## 2023-04-28 RX ORDER — ZOLMITRIPTAN 2.5 MG/1
2.5-5 TABLET, FILM COATED ORAL PRN
Qty: 10 TABLET | Refills: 3 | Status: SHIPPED | OUTPATIENT
Start: 2023-04-28 | End: 2023-08-01 | Stop reason: SDUPTHER

## 2023-04-28 RX ORDER — ONDANSETRON 4 MG/1
4 TABLET, FILM COATED ORAL EVERY 4 HOURS PRN
Qty: 20 TABLET | Refills: 3 | Status: SHIPPED | OUTPATIENT
Start: 2023-04-28 | End: 2023-08-01 | Stop reason: SDUPTHER

## 2023-04-28 RX ORDER — TESTOSTERONE CYPIONATE 200 MG/ML
100 INJECTION, SOLUTION INTRAMUSCULAR
Qty: 2 ML | Refills: 2 | Status: SHIPPED | OUTPATIENT
Start: 2023-04-28 | End: 2023-07-05 | Stop reason: SDUPTHER

## 2023-04-28 RX ORDER — NAPROXEN 500 MG/1
500 TABLET ORAL
Qty: 30 TABLET | Refills: 0 | Status: SHIPPED | OUTPATIENT
Start: 2023-04-28

## 2023-04-28 ASSESSMENT — PATIENT HEALTH QUESTIONNAIRE - PHQ9: CLINICAL INTERPRETATION OF PHQ2 SCORE: 0

## 2023-04-28 NOTE — PATIENT INSTRUCTIONS
Here are the supplements I recommend for migraine prophylaxis:  - Riboflavin 400 mg daily  - CoQ-10 100 mg twice daily  - Magnesium citrate 600 mg daily     For migraine   - take zolmitriptan, naproxen, tylenol and zofran    Allergies  Daily allergy medicine  Daily steroid nasal spray    After the 6 weeks of Vitamin D prescription  Start over the counter vitamin D3 2,000 units daily

## 2023-04-28 NOTE — PROGRESS NOTES
"Subjective:     CC: \"allergies, migraines and hormone replacement therapy\"    HPI:   Rosalie presents today with:    Allergy season  Has not started allergy medicine  Congestion, itchy eyes, cough  Saline rinses sometimes feels like a road block    Migraines has been increased in frequency, has been weekly  One lasted 3 days  Another one felt like there was cold sweat, but no actual sweat  Maxalt has tried up to 10 mg and didn't get relief as expected    HRT  Was able to get testosterone from [facility]  Effects are in a good place  Currently on 0.25 ml every 2 weeks, has noted some mood swings  Some change to cycle, sometimes spotting and sometimes regular      No problems updated.    Current Outpatient Medications Ordered in Epic   Medication Sig Dispense Refill    zolmitriptan (ZOMIG) 2.5 MG Tab Take 1-2 Tablets by mouth as needed for Migraine. 10 Tablet 3    naproxen (NAPROSYN) 500 MG Tab Take 1 Tablet by mouth 1 time a day as needed (migraine). 30 Tablet 0    ondansetron (ZOFRAN) 4 MG Tab tablet Take 1 Tablet by mouth every four hours as needed for Nausea/Vomiting for up to 90 days. 20 Tablet 3    vitamin D2, Ergocalciferol, (DRISDOL) 1.25 MG (40282 UT) Cap capsule Take 1 Capsule by mouth every 7 days. 6 Capsule 0    testosterone cypionate (DEPO-TESTOSTERONE) 200 MG/ML Solution injection Inject 0.5 mL into the shoulder, thigh, or buttocks every 14 days for 28 days. Indications: Transgender Man 2 mL 2     No current Epic-ordered facility-administered medications on file.       Health Maintenance: acute visit    ROS:  ROS see HPI    Objective:     Exam:  /68   Pulse 85   Temp 37.1 °C (98.7 °F) (Temporal)   Ht 1.702 m (5' 7\")   Wt 65 kg (143 lb 3.2 oz)   SpO2 99%   BMI 22.43 kg/m²  Body mass index is 22.43 kg/m².    Physical Exam  Vitals reviewed.   Constitutional:       General: She is not in acute distress.     Appearance: Normal appearance.   HENT:      Head: Normocephalic and atraumatic. "   Cardiovascular:      Rate and Rhythm: Normal rate and regular rhythm.      Heart sounds: Normal heart sounds.   Pulmonary:      Effort: Pulmonary effort is normal. No respiratory distress.      Breath sounds: Normal breath sounds.   Skin:     General: Skin is warm and dry.   Neurological:      Mental Status: She is alert. Mental status is at baseline.   Psychiatric:         Mood and Affect: Mood normal.         Behavior: Behavior normal.             Assessment & Plan:     22 y.o. female with the following -     1. Chronic nonseasonal allergic rhinitis due to pollen  Daily allergy medicine  Daily steroid nasal spray    2. Migraine syndrome  Here are the supplements I recommend for migraine prophylaxis:  - Riboflavin 400 mg daily  - CoQ-10 100 mg twice daily  - Magnesium citrate 600 mg daily     For migraine   - take zolmitriptan, naproxen, tylenol and zofran    - Referral to Neurology  - zolmitriptan (ZOMIG) 2.5 MG Tab; Take 1-2 Tablets by mouth as needed for Migraine.  Dispense: 10 Tablet; Refill: 3  - naproxen (NAPROSYN) 500 MG Tab; Take 1 Tablet by mouth 1 time a day as needed (migraine).  Dispense: 30 Tablet; Refill: 0  - ondansetron (ZOFRAN) 4 MG Tab tablet; Take 1 Tablet by mouth every four hours as needed for Nausea/Vomiting for up to 90 days.  Dispense: 20 Tablet; Refill: 3    3. Vitamin D deficiency  After the 6 weeks of Vitamin D prescription  Start over the counter vitamin D3 2,000 units daily  - vitamin D2, Ergocalciferol, (DRISDOL) 1.25 MG (35838 UT) Cap capsule; Take 1 Capsule by mouth every 7 days.  Dispense: 6 Capsule; Refill: 0    4. Hormone replacement therapy (HRT)  Having some mood swing, will go up to 100 mg Q2wks from 50 mg  - testosterone cypionate (DEPO-TESTOSTERONE) 200 MG/ML Solution injection; Inject 0.5 mL into the shoulder, thigh, or buttocks every 14 days for 28 days. Indications: Transgender Man  Dispense: 2 mL; Refill: 2       Return in about 6 months (around 10/28/2023) for  Lab F/U, Medication F/U, Controlled Substance.    Please note that this dictation was created using voice recognition software. I have made every reasonable attempt to correct obvious errors, but I expect that there are errors of grammar and possibly content that I did not discover before finalizing the note.

## 2023-07-05 DIAGNOSIS — Z79.890 HORMONE REPLACEMENT THERAPY (HRT): ICD-10-CM

## 2023-07-05 RX ORDER — TESTOSTERONE CYPIONATE 200 MG/ML
100 INJECTION, SOLUTION INTRAMUSCULAR
Qty: 2 ML | Refills: 2 | Status: SHIPPED | OUTPATIENT
Start: 2023-07-05 | End: 2023-12-07 | Stop reason: SDUPTHER

## 2023-08-01 ENCOUNTER — TELEPHONE (OUTPATIENT)
Dept: NEUROLOGY | Facility: MEDICAL CENTER | Age: 23
End: 2023-08-01
Payer: COMMERCIAL

## 2023-08-01 ENCOUNTER — OFFICE VISIT (OUTPATIENT)
Dept: NEUROLOGY | Facility: MEDICAL CENTER | Age: 23
End: 2023-08-01
Attending: PSYCHIATRY & NEUROLOGY
Payer: COMMERCIAL

## 2023-08-01 VITALS
WEIGHT: 146.39 LBS | HEIGHT: 67 IN | TEMPERATURE: 99 F | DIASTOLIC BLOOD PRESSURE: 60 MMHG | BODY MASS INDEX: 22.98 KG/M2 | SYSTOLIC BLOOD PRESSURE: 112 MMHG | OXYGEN SATURATION: 97 % | HEART RATE: 91 BPM

## 2023-08-01 DIAGNOSIS — G43.109 MIGRAINE WITH AURA AND WITHOUT STATUS MIGRAINOSUS, NOT INTRACTABLE: ICD-10-CM

## 2023-08-01 PROCEDURE — 99211 OFF/OP EST MAY X REQ PHY/QHP: CPT | Performed by: PSYCHIATRY & NEUROLOGY

## 2023-08-01 PROCEDURE — 99205 OFFICE O/P NEW HI 60 MIN: CPT | Performed by: PSYCHIATRY & NEUROLOGY

## 2023-08-01 PROCEDURE — 3078F DIAST BP <80 MM HG: CPT | Performed by: PSYCHIATRY & NEUROLOGY

## 2023-08-01 PROCEDURE — 3074F SYST BP LT 130 MM HG: CPT | Performed by: PSYCHIATRY & NEUROLOGY

## 2023-08-01 RX ORDER — ZOLMITRIPTAN 5 MG/1
TABLET, FILM COATED ORAL
Qty: 9 TABLET | Refills: 1 | Status: SHIPPED | OUTPATIENT
Start: 2023-08-01

## 2023-08-01 RX ORDER — ONDANSETRON 4 MG/1
4 TABLET, FILM COATED ORAL EVERY 4 HOURS PRN
Qty: 20 TABLET | Refills: 3 | Status: SHIPPED | OUTPATIENT
Start: 2023-08-01 | End: 2023-10-30

## 2023-08-01 ASSESSMENT — ENCOUNTER SYMPTOMS
HEADACHES: 1
PHOTOPHOBIA: 0
NECK PAIN: 0
MEMORY LOSS: 0

## 2023-08-01 NOTE — TELEPHONE ENCOUNTER
Zolmitriptan 5 MG Tabs    Request rec'd via [facility], RTC ADRIEL Chavarria paid copay $20.00 #18/30 DS or $20.00 #9/30 DS order written for 9/30 - MAX QTY SUPPLY   DAY PERIOD MAXIMUM DAY SUPPLY  ALLOWED - notifying liaison Jenn Weir or Outreach. - 08/01/2023 3:13pm

## 2023-08-01 NOTE — PROGRESS NOTES
Liberty Tijerina is a 23 y.o. adult who presents from the office of Dr. Aries Bruner DO, for consultation, with a history of migraine with aura.     OSITO Iyer is a very pleasant 23-year-old right-handed transgender male who has been suffering from headaches since his late teens, but which have been slowly and steadily increasing over the last year.    Prodrome: Other than some malaise, nothing of note.    Aura: Occurring with each of the headache attacks, it is most likely involving 1 or the other eye, left or right-sided equally, though it can be bilateral.  He describes a fractured glass type of pattern as if you are breaking his CD.  This is associated with color distortions around the fracture.  This can last upwards of 30 minutes typically, rarely more than 60 minutes.    Headache: This typically starts either after the aura has resolved or as it is near completely gone.  The headaches are typically bilateral and frontal, retro-orbital, or throbbing in quality, incapacitating, nausea and vomiting invariably and sooner if inadequately treated, but there are heightened sensitivities, impaired concentration, etc.  His neck can be stiffened, he denies allodynia or autonomic symptoms.    Duration: 2 days is typical, rarely 1/3-day to recover.    Start: Late teens.    Onset: Random over the course of the day.    Frequency: Over the last year they have been increasing to about once a month.  Initially they were very very rare.  This increasing pattern has been ongoing for about 1 year.    Triggers: None identified.    Work-up: No directed work-up has been done to date.    Treatment: He has only been given rescue medications, but also at the lower dose regimens.  Initially Imitrex 25 mg was prescribed, not surprisingly this provided limited benefit even though several tablets had been taken.  Rizatriptan 5 mg was then attempted, again with limited benefit, Zomig 2.5 mg is now used in  "cocktail with Naprosyn 500 mg and Zofran 4 mg.  The latter medication definitely helps the nausea and vomiting, but the former 2 provide limited benefit for the headache.  He does repeat the Zomig on occasion but again with limited benefit.    Other than the above, he has a history of ADHD, no history of IBD, tremor, sleep distortion, CVA, CAD, PVD, malignancy, thyroid disease, glaucoma, kidney stones, autoimmune disease, neurodegenerative disease, seizure, MS, or pulmonary disease.    There is no surgical history of note.    In the family his mother suffered from migraine headaches infrequently, but his full siblings and father either do not or he is not sure.    On hormone replacement therapy, he has become secondarily amenorrheic.  There has never been any association of his headaches with institution of HRT for his transition.    He does not smoke, rarely drinks alcohol.  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    He is on Depo testosterone, Zomig 2.5 mg and Naprosyn 5 mg as needed, Zofran 4 mg as needed and vitamin D.    Review of Systems   Eyes:  Negative for photophobia.   Musculoskeletal:  Negative for neck pain.   Neurological:  Positive for headaches.   Psychiatric/Behavioral:  Negative for memory loss.    All other systems reviewed and are negative.    Objective     /60   Pulse 91   Temp 37.2 °C (99 °F) (Temporal)   Ht 1.702 m (5' 7\")   Wt 66.4 kg (146 lb 6.2 oz)   SpO2 97%   BMI 22.93 kg/m²      Physical Exam    He appears in no acute distress.  His vital signs are stable.  There is no malar rash, there is some mild jaw tenderness but no temporal tenderness or jaw claudication.  The neck is supple, range of motion is full, there is some mild tenderness bilaterally of the occipital ridge as well as the cervical paraspinal muscle bodies.  There are no trigger points.  Cardiac evaluation reveals a regular rhythm.  There is no lower extremity " edema.     Neurological Exam    Fully oriented, there is no aphasia, apraxia, or inattention.    PERRLA/EOMI, visual fields are full to finger counting on confrontation bilaterally.  Funduscopic exam reveals sharp disc margin on the right.  I could not visualize the fundus on the left.  Facial movements are symmetric, sensory exam is intact to temperature and light touch bilaterally.  The tongue and uvula are midline, there is no bulbar dysfunction.  Jaw movements are intact.  Shoulder shrug and head rotation are normal.    Musculoskeletal exam reveals normal tone bilaterally, there is no tremor, asterixis, or drift.  Strength is 5/5 bilaterally.  Reflexes are brisk and present at all points, there are no asymmetries, ankle jerks are diminished slightly, but there are no dropped reflexes.  Both toes are downgoing.    He stands easily, gait is normal and station and stride length, armswing is symmetric.  Heel, toe, and tandem walking are all normal.  There is no appendicular dystaxia.  Fine motor control and repetitive movements are normal and symmetric in all 4 extremities with maintained amplitude and frequencies throughout.    Sensory exam is intact to temperature and vibration, Romberg is absent.    Assessment & Plan     1. Migraine with aura and without status migrainosus, not intractable  His diagnosis is fairly straightforward, though unfortunately the headaches are beginning to increase in frequency, they have yet to really approach the threshold where maintenance therapies are indicated.  Still, even the most effective rescue does not always have a benefit on overall headache patterns and development chronically.  Thus, we talked about maintenance therapy is being used to prevent the headaches from evolving, occurring more frequently and with greater severity, he is opting to simply treat acutely and see what happens.    Unfortunately we do not have data supporting maintenance therapies altering long-term  prognosis and headache evolution.  In this clinical setting, he still has a less than 0.2% chance that these headaches are symptomatic of something else, thus a work-up really is not indicated.  If his headaches were to suddenly change in nature, frequency, severity, and/or become associated with atypical neurologic signs and symptoms, and then diagnostics are indicated.  He was told to contact the office in those circumstances quickly or if not, get into an urgent care setting.    Unfortunately the trials of medications in the past were inadequate and dosing, higher doses should be used to be sure that an adequate trial is attempted.  He was also told he should take rescue as soon as the headache itself begins, not taken with the aura symptoms themselves.  Studied with migraine aura, as a class, triptans were never found to be effective in preventing headache pain from evolving.    Zomig 5 mg is to be taken with Naprosyn 500 mg and Zofran 4 mg as a cocktail when pain first starts, this can then be repeated within an hour or longer if needed.  If ineffective, we can then retry Maxalt or Imitrex, we also have quite a few other options including the CGRP's and Reyvow.  We will maintain contact through [facility] in the interim, he and I can follow-up otherwise in 6 months.    - zolmitriptan (ZOMIG) 5 MG tablet; 1 tab at headache onset; repeat in 1 hour prn  Dispense: 9 Tablet; Refill: 1  - ondansetron (ZOFRAN) 4 MG Tab tablet; Take 1 Tablet by mouth every four hours as needed for Nausea/Vomiting for up to 90 days.  Dispense: 20 Tablet; Refill: 3    Time: 60 minutes in total spent on patient care including pre-charting, record review, discussion with healthcare staff and documentation.  This includes face-to-face time for exam, review, discussion, as well as counseling and coordinating care.

## 2023-08-02 ENCOUNTER — TELEPHONE (OUTPATIENT)
Dept: NEUROLOGY | Facility: MEDICAL CENTER | Age: 23
End: 2023-08-02
Payer: COMMERCIAL

## 2023-08-02 NOTE — TELEPHONE ENCOUNTER
Attempted to contact patient at [phone] to discuss [facility] and services/benefits offered. No answer, left voicemail.    Liyah Colón

## 2023-09-07 DIAGNOSIS — Z79.890 HORMONE REPLACEMENT THERAPY (HRT): ICD-10-CM

## 2023-09-07 DIAGNOSIS — Z13.6 SCREENING FOR CARDIOVASCULAR CONDITION: ICD-10-CM

## 2023-09-07 DIAGNOSIS — F41.8 DEPRESSION WITH ANXIETY: ICD-10-CM

## 2023-09-07 DIAGNOSIS — E55.9 VITAMIN D DEFICIENCY: ICD-10-CM

## 2023-09-07 DIAGNOSIS — Z11.59 NEED FOR HEPATITIS C SCREENING TEST: ICD-10-CM

## 2023-10-10 ENCOUNTER — HOSPITAL ENCOUNTER (OUTPATIENT)
Dept: LAB | Facility: MEDICAL CENTER | Age: 23
End: 2023-10-10
Attending: FAMILY MEDICINE
Payer: COMMERCIAL

## 2023-10-10 DIAGNOSIS — Z13.6 SCREENING FOR CARDIOVASCULAR CONDITION: ICD-10-CM

## 2023-10-10 DIAGNOSIS — E55.9 VITAMIN D DEFICIENCY: ICD-10-CM

## 2023-10-10 DIAGNOSIS — F41.8 DEPRESSION WITH ANXIETY: ICD-10-CM

## 2023-10-10 DIAGNOSIS — Z79.890 HORMONE REPLACEMENT THERAPY (HRT): ICD-10-CM

## 2023-10-10 DIAGNOSIS — Z11.59 NEED FOR HEPATITIS C SCREENING TEST: ICD-10-CM

## 2023-10-10 LAB
25(OH)D3 SERPL-MCNC: 20 NG/ML (ref 30–100)
ALBUMIN SERPL BCP-MCNC: 4.6 G/DL (ref 3.2–4.9)
ALBUMIN/GLOB SERPL: 1.7 G/DL
ALP SERPL-CCNC: 53 U/L (ref 30–99)
ALT SERPL-CCNC: 39 U/L (ref 2–50)
ANION GAP SERPL CALC-SCNC: 9 MMOL/L (ref 7–16)
AST SERPL-CCNC: 64 U/L (ref 12–45)
BASOPHILS # BLD AUTO: 0.6 % (ref 0–1.8)
BASOPHILS # BLD: 0.03 K/UL (ref 0–0.12)
BILIRUB SERPL-MCNC: 0.4 MG/DL (ref 0.1–1.5)
BUN SERPL-MCNC: 9 MG/DL (ref 8–22)
CALCIUM ALBUM COR SERPL-MCNC: 8.9 MG/DL (ref 8.5–10.5)
CALCIUM SERPL-MCNC: 9.4 MG/DL (ref 8.5–10.5)
CHLORIDE SERPL-SCNC: 104 MMOL/L (ref 96–112)
CHOLEST SERPL-MCNC: 154 MG/DL (ref 100–199)
CO2 SERPL-SCNC: 27 MMOL/L (ref 20–33)
CREAT SERPL-MCNC: 0.96 MG/DL (ref 0.5–1.4)
EOSINOPHIL # BLD AUTO: 0.24 K/UL (ref 0–0.51)
EOSINOPHIL NFR BLD: 4.7 % (ref 0–6.9)
ERYTHROCYTE [DISTWIDTH] IN BLOOD BY AUTOMATED COUNT: 40.9 FL (ref 35.9–50)
FASTING STATUS PATIENT QL REPORTED: NORMAL
GFR SERPLBLD CREATININE-BSD FMLA CKD-EPI: 84 ML/MIN/1.73 M 2
GLOBULIN SER CALC-MCNC: 2.7 G/DL (ref 1.9–3.5)
GLUCOSE SERPL-MCNC: 94 MG/DL (ref 65–99)
HCT VFR BLD AUTO: 44.6 % (ref 37–47)
HCV AB SER QL: NORMAL
HDLC SERPL-MCNC: 43 MG/DL
HGB BLD-MCNC: 15 G/DL (ref 12–16)
IMM GRANULOCYTES # BLD AUTO: 0.01 K/UL (ref 0–0.11)
IMM GRANULOCYTES NFR BLD AUTO: 0.2 % (ref 0–0.9)
LDLC SERPL CALC-MCNC: 100 MG/DL
LYMPHOCYTES # BLD AUTO: 1.25 K/UL (ref 1–4.8)
LYMPHOCYTES NFR BLD: 24.6 % (ref 22–41)
MCH RBC QN AUTO: 28.6 PG (ref 27–33)
MCHC RBC AUTO-ENTMCNC: 33.6 G/DL (ref 32.2–35.5)
MCV RBC AUTO: 85 FL (ref 81.4–97.8)
MONOCYTES # BLD AUTO: 0.55 K/UL (ref 0–0.85)
MONOCYTES NFR BLD AUTO: 10.8 % (ref 0–13.4)
NEUTROPHILS # BLD AUTO: 3.01 K/UL (ref 1.82–7.42)
NEUTROPHILS NFR BLD: 59.1 % (ref 44–72)
NRBC # BLD AUTO: 0 K/UL
NRBC BLD-RTO: 0 /100 WBC (ref 0–0.2)
PLATELET # BLD AUTO: 248 K/UL (ref 164–446)
PMV BLD AUTO: 9.3 FL (ref 9–12.9)
POTASSIUM SERPL-SCNC: 4 MMOL/L (ref 3.6–5.5)
PROT SERPL-MCNC: 7.3 G/DL (ref 6–8.2)
RBC # BLD AUTO: 5.25 M/UL (ref 4.2–5.4)
SODIUM SERPL-SCNC: 140 MMOL/L (ref 135–145)
TESTOST SERPL-MCNC: 394 NG/DL (ref 9–75)
TRIGL SERPL-MCNC: 57 MG/DL (ref 0–149)
TSH SERPL DL<=0.005 MIU/L-ACNC: 1.1 UIU/ML (ref 0.38–5.33)
WBC # BLD AUTO: 5.1 K/UL (ref 4.8–10.8)

## 2023-10-10 PROCEDURE — 84443 ASSAY THYROID STIM HORMONE: CPT

## 2023-10-10 PROCEDURE — 84403 ASSAY OF TOTAL TESTOSTERONE: CPT

## 2023-10-10 PROCEDURE — 82306 VITAMIN D 25 HYDROXY: CPT

## 2023-10-10 PROCEDURE — 85025 COMPLETE CBC W/AUTO DIFF WBC: CPT

## 2023-10-10 PROCEDURE — 36415 COLL VENOUS BLD VENIPUNCTURE: CPT

## 2023-10-10 PROCEDURE — 80061 LIPID PANEL: CPT

## 2023-10-10 PROCEDURE — 80053 COMPREHEN METABOLIC PANEL: CPT

## 2023-10-10 PROCEDURE — 86803 HEPATITIS C AB TEST: CPT

## 2023-12-04 ENCOUNTER — APPOINTMENT (OUTPATIENT)
Dept: MEDICAL GROUP | Facility: MEDICAL CENTER | Age: 23
End: 2023-12-04
Payer: COMMERCIAL

## 2023-12-06 DIAGNOSIS — Z79.890 HORMONE REPLACEMENT THERAPY (HRT): ICD-10-CM

## 2023-12-07 ENCOUNTER — OFFICE VISIT (OUTPATIENT)
Dept: MEDICAL GROUP | Facility: MEDICAL CENTER | Age: 23
End: 2023-12-07
Payer: COMMERCIAL

## 2023-12-07 VITALS
SYSTOLIC BLOOD PRESSURE: 108 MMHG | WEIGHT: 146 LBS | DIASTOLIC BLOOD PRESSURE: 68 MMHG | BODY MASS INDEX: 22.91 KG/M2 | OXYGEN SATURATION: 95 % | TEMPERATURE: 97.8 F | HEART RATE: 79 BPM | HEIGHT: 67 IN

## 2023-12-07 DIAGNOSIS — G43.109 MIGRAINE WITH AURA AND WITHOUT STATUS MIGRAINOSUS, NOT INTRACTABLE: ICD-10-CM

## 2023-12-07 DIAGNOSIS — Z02.89 ENCOUNTER FOR COMPLETION OF FORM WITH PATIENT: ICD-10-CM

## 2023-12-07 DIAGNOSIS — R20.0 FINGER NUMBNESS: ICD-10-CM

## 2023-12-07 DIAGNOSIS — Z79.890 HORMONE REPLACEMENT THERAPY (HRT): ICD-10-CM

## 2023-12-07 DIAGNOSIS — Z23 NEED FOR VACCINATION: ICD-10-CM

## 2023-12-07 DIAGNOSIS — E55.9 VITAMIN D DEFICIENCY: ICD-10-CM

## 2023-12-07 PROCEDURE — 90715 TDAP VACCINE 7 YRS/> IM: CPT | Performed by: FAMILY MEDICINE

## 2023-12-07 PROCEDURE — 99214 OFFICE O/P EST MOD 30 MIN: CPT | Mod: 25 | Performed by: FAMILY MEDICINE

## 2023-12-07 PROCEDURE — 3074F SYST BP LT 130 MM HG: CPT | Performed by: FAMILY MEDICINE

## 2023-12-07 PROCEDURE — 90686 IIV4 VACC NO PRSV 0.5 ML IM: CPT | Performed by: FAMILY MEDICINE

## 2023-12-07 PROCEDURE — 90472 IMMUNIZATION ADMIN EACH ADD: CPT | Performed by: FAMILY MEDICINE

## 2023-12-07 PROCEDURE — 90471 IMMUNIZATION ADMIN: CPT | Performed by: FAMILY MEDICINE

## 2023-12-07 PROCEDURE — 3078F DIAST BP <80 MM HG: CPT | Performed by: FAMILY MEDICINE

## 2023-12-07 RX ORDER — TESTOSTERONE CYPIONATE 200 MG/ML
100 INJECTION, SOLUTION INTRAMUSCULAR
Qty: 2 ML | Refills: 2 | Status: SHIPPED | OUTPATIENT
Start: 2023-12-07 | End: 2024-02-07

## 2023-12-07 RX ORDER — TESTOSTERONE CYPIONATE 200 MG/ML
INJECTION, SOLUTION INTRAMUSCULAR
Qty: 2 ML | Refills: 2 | OUTPATIENT
Start: 2023-12-07

## 2023-12-07 RX ORDER — ERGOCALCIFEROL 1.25 MG/1
50000 CAPSULE ORAL
Qty: 12 CAPSULE | Refills: 0 | Status: SHIPPED | OUTPATIENT
Start: 2023-12-07

## 2023-12-07 RX ORDER — ONDANSETRON 4 MG/1
4 TABLET, ORALLY DISINTEGRATING ORAL EVERY 6 HOURS PRN
COMMUNITY

## 2023-12-07 ASSESSMENT — FIBROSIS 4 INDEX: FIB4 SCORE: 1.03

## 2023-12-07 NOTE — PROGRESS NOTES
"Subjective:     CC: \"medication management and paperwork\"    HPI:   Rosalie presents today with:    Has paperwork for accomodation for work due to migraines  Even with the Zomig can still have migraines that cause vision changes and vomiting  Would like to have intermittent leave  Having migraines that cause absence about 1-2 times per month  Aura happens before, gets a \"fracture\" of vision  Headache describes as painful, front of the head, can be constant pain or can fluctuate  Endorses photophobia, phonophobia, nausea, vomiting with these  Takes Zomig 5 mg, Naproxen 500 mg and Ondansetron 4 mg    Hormone replacement Therapy  No concerning side effects  No mood symptoms  Labs reviewed    Right hand index finger  Feels like finger can get numbness at the tip  Doesn't happen all the time  Can last 5 -20 minutes  Not painful  Most apparent when driving or relaxing at home    No problems updated.    Current Outpatient Medications Ordered in Epic   Medication Sig Dispense Refill    ondansetron (ZOFRAN ODT) 4 MG TABLET DISPERSIBLE Take 4 mg by mouth every 6 hours as needed for Nausea/Vomiting.      vitamin D2, Ergocalciferol, (DRISDOL) 1.25 MG (08875 UT) Cap capsule Take 1 Capsule by mouth every 7 days. 12 Capsule 0    testosterone cypionate (DEPO-TESTOSTERONE) 200 MG/ML injection Inject 0.5 mL into the shoulder, thigh, or buttocks every 14 days for 168 days. Indications: Transgender Man 2 mL 2    zolmitriptan (ZOMIG) 5 MG tablet 1 tab at headache onset; repeat in 1 hour prn 9 Tablet 1    naproxen (NAPROSYN) 500 MG Tab Take 1 Tablet by mouth 1 time a day as needed (migraine). 30 Tablet 0     No current Epic-ordered facility-administered medications on file.       Health Maintenance: tdap and influenza    ROS:  ROS see HPI    Objective:     Exam:  /68 (BP Location: Left arm, Patient Position: Sitting)   Pulse 79   Temp 36.6 °C (97.8 °F) (Temporal)   Ht 1.702 m (5' 7\")   Wt 66.2 kg (146 lb)   SpO2 95%   BMI " 22.87 kg/m²  Body mass index is 22.87 kg/m².    Physical Exam  Vitals reviewed.   Constitutional:       General: Jaylon Tijerina is not in acute distress.     Appearance: Normal appearance.   HENT:      Head: Normocephalic and atraumatic.   Cardiovascular:      Rate and Rhythm: Normal rate and regular rhythm.      Heart sounds: Normal heart sounds.   Pulmonary:      Effort: Pulmonary effort is normal. No respiratory distress.      Breath sounds: Normal breath sounds.   Musculoskeletal:         General: No swelling, tenderness, deformity or signs of injury.      Comments: Negative Phalen's and Negative tap test   Skin:     General: Skin is warm and dry.   Neurological:      Mental Status: Jaylon Tijerina is alert. Mental status is at baseline.      Gait: Gait normal.   Psychiatric:         Mood and Affect: Mood normal.         Behavior: Behavior normal.       Labs:                 Assessment & Plan:     23 y.o. adult with the following -     1. Hormone replacement therapy (HRT)  Labs reviewed and generally reassuring, do need to watch AST  Will continue on current management patient feels comfortable with dose and level  No concerning side effects reported  - testosterone cypionate (DEPO-TESTOSTERONE) 200 MG/ML injection; Inject 0.5 mL into the shoulder, thigh, or buttocks every 14 days for 168 days. Indications: Transgender Man  Dispense: 2 mL; Refill: 2  - Comp Metabolic Panel; Future  - CBC WITHOUT DIFFERENTIAL; Future  - TESTOSTERONE SERUM; Future    2. Migraine with aura and without status migrainosus, not intractable  Chronic and stable, patient uses Naproxen, Ondansetron and Zomig as directed. Reports most headaches are able to be taken care of with these medications however can have 1-2 break through migraines per day leading to a need for intermittent leave.  - ondansetron (ZOFRAN ODT) 4 MG TABLET DISPERSIBLE; Take 4 mg by mouth every 6 hours as needed for Nausea/Vomiting.    3.  Vitamin D deficiency  - vitamin D2, Ergocalciferol, (DRISDOL) 1.25 MG (77337 UT) Cap capsule; Take 1 Capsule by mouth every 7 days.  Dispense: 12 Capsule; Refill: 0    4. Finger numbness  Patient with intermittent short lived numbness in right index fingers, negative Phalen's and Tap test. Discussed with patient that I am not sure about this one, not a lot of clues to go on. At this time it does not have evidence for concerning process. Discussed possibility of evolving carpal tunnel and could try night splinting. If progresses can discuss and work up further.    5. Encounter for completion of form with patient  Form filled out for intermittent leave and returned to patient    6. Need for vaccination  Pt desires vaccination.  Risks and benefits discussed.  No contraindications today.  Vaccine given.  Follow-up precautions discussed.    - Influenza Vaccine Quad Injection (PF)  - Tdap =>6yo IM            Return in about 6 months (around 6/7/2024) for Lab F/U.    Please note that this dictation was created using voice recognition software. I have made every reasonable attempt to correct obvious errors, but I expect that there are errors of grammar and possibly content that I did not discover before finalizing the note.

## 2024-02-07 DIAGNOSIS — Z79.890 HORMONE REPLACEMENT THERAPY (HRT): ICD-10-CM

## 2024-02-07 RX ORDER — TESTOSTERONE CYPIONATE 200 MG/ML
INJECTION, SOLUTION INTRAMUSCULAR
Qty: 2 ML | Refills: 2 | Status: SHIPPED | OUTPATIENT
Start: 2024-02-07 | End: 2024-05-07

## 2024-02-07 NOTE — TELEPHONE ENCOUNTER
Received request via: Pharmacy    Was the patient seen in the last year in this department? Yes    Does the patient have an active prescription (recently filled or refills available) for medication(s) requested? No    Pharmacy Name:  [id number] - [place], [place] - [address] [facility]     Does the patient have jail Plus and need 100 day supply (blood pressure, diabetes and cholesterol meds only)? Patient does not have SCP

## 2024-06-26 RX ORDER — ZOLMITRIPTAN 2.5 MG/1
TABLET, FILM COATED ORAL
Qty: 10 TABLET | Refills: 2 | Status: SHIPPED | OUTPATIENT
Start: 2024-06-26

## 2024-06-26 NOTE — TELEPHONE ENCOUNTER
Received request via: Pharmacy    Was the patient seen in the last year in this department? Yes    Does the patient have an active prescription (recently filled or refills available) for medication(s) requested? No    Pharmacy Name: [id number] - [place], [place] - [address] AT [address]     Does the patient have alf Plus and need 100 day supply (blood pressure, diabetes and cholesterol meds only)? Patient does not have SCP

## 2025-05-08 ENCOUNTER — HOSPITAL ENCOUNTER (OUTPATIENT)
Facility: MEDICAL CENTER | Age: 25
End: 2025-05-08
Attending: FAMILY MEDICINE
Payer: COMMERCIAL

## 2025-05-08 ENCOUNTER — APPOINTMENT (OUTPATIENT)
Dept: MEDICAL GROUP | Facility: MEDICAL CENTER | Age: 25
End: 2025-05-08
Payer: COMMERCIAL

## 2025-05-08 VITALS
DIASTOLIC BLOOD PRESSURE: 54 MMHG | HEART RATE: 78 BPM | WEIGHT: 144.8 LBS | HEIGHT: 67 IN | BODY MASS INDEX: 22.73 KG/M2 | RESPIRATION RATE: 18 BRPM | OXYGEN SATURATION: 97 % | TEMPERATURE: 98.1 F | SYSTOLIC BLOOD PRESSURE: 110 MMHG

## 2025-05-08 DIAGNOSIS — J30.89 CHRONIC NONSEASONAL ALLERGIC RHINITIS DUE TO POLLEN: ICD-10-CM

## 2025-05-08 DIAGNOSIS — Z02.89 ENCOUNTER FOR COMPLETION OF FORM WITH PATIENT: ICD-10-CM

## 2025-05-08 DIAGNOSIS — F41.8 DEPRESSION WITH ANXIETY: ICD-10-CM

## 2025-05-08 DIAGNOSIS — E55.9 VITAMIN D DEFICIENCY: ICD-10-CM

## 2025-05-08 DIAGNOSIS — Z11.4 ENCOUNTER FOR SCREENING FOR HIV: ICD-10-CM

## 2025-05-08 DIAGNOSIS — Z13.6 SCREENING FOR CARDIOVASCULAR CONDITION: ICD-10-CM

## 2025-05-08 DIAGNOSIS — Z79.890 HORMONE REPLACEMENT THERAPY (HRT): ICD-10-CM

## 2025-05-08 DIAGNOSIS — G43.109 MIGRAINE WITH AURA AND WITHOUT STATUS MIGRAINOSUS, NOT INTRACTABLE: ICD-10-CM

## 2025-05-08 PROCEDURE — 3078F DIAST BP <80 MM HG: CPT | Performed by: FAMILY MEDICINE

## 2025-05-08 PROCEDURE — 80307 DRUG TEST PRSMV CHEM ANLYZR: CPT

## 2025-05-08 PROCEDURE — 3074F SYST BP LT 130 MM HG: CPT | Performed by: FAMILY MEDICINE

## 2025-05-08 PROCEDURE — 99214 OFFICE O/P EST MOD 30 MIN: CPT | Performed by: FAMILY MEDICINE

## 2025-05-08 RX ORDER — TESTOSTERONE CYPIONATE 200 MG/ML
100 INJECTION, SOLUTION INTRAMUSCULAR
Qty: 3 ML | Refills: 0 | Status: SHIPPED | OUTPATIENT
Start: 2025-05-08 | End: 2025-08-06

## 2025-05-08 RX ORDER — ONDANSETRON 4 MG/1
4 TABLET, ORALLY DISINTEGRATING ORAL EVERY 6 HOURS PRN
Qty: 10 TABLET | Refills: 11 | Status: SHIPPED | OUTPATIENT
Start: 2025-05-08

## 2025-05-08 RX ORDER — ZOLMITRIPTAN 5 MG/1
TABLET, FILM COATED ORAL
Qty: 9 TABLET | Refills: 11 | Status: SHIPPED | OUTPATIENT
Start: 2025-05-08

## 2025-05-08 RX ORDER — NAPROXEN 500 MG/1
500 TABLET ORAL
Qty: 30 TABLET | Refills: 0 | Status: SHIPPED | OUTPATIENT
Start: 2025-05-08

## 2025-05-08 ASSESSMENT — FIBROSIS 4 INDEX: FIB4 SCORE: 1.12

## 2025-05-08 NOTE — PROGRESS NOTES
"Verbal consent was acquired by the patient to use [employer] ambient listening note generation during this visit    Subjective:     CC: \"migraines, FMLA, HRT\"    History of Present Illness  The patient presents for evaluation of migraines, stress headaches, testosterone level check, and health maintenance.    Migraines  They report that their migraines have remained consistent, with episodes occurring once or twice a month. They describe their typical migraine as a dull, frontal pain, occasionally accompanied by ocular discomfort and visual disturbances. They have been advised to administer Zomig at the onset of these auras. In severe cases, they resort to bed rest in a dark room, often resulting in tears due to the intensity of the pain. They have not explored preventive treatments due to the infrequency of their migraines. Their current medication regimen provides some relief, allowing them to engage in activities such as reading despite being unable to work. They have found naproxen to be more effective than ibuprofen for their migraines. They are currently seeking a refill of their Zomig prescription, as they only have one dose remaining. They typically take two tablets per episode, as recommended by their neurologist if the first dose is ineffective after an hour. They are also requesting a refill of their Zofran prescription for nausea management.  - Onset: Episodes occurring once or twice a month.  - Location: Frontal pain, occasionally accompanied by ocular discomfort.  - Character: Dull pain with visual disturbances.  - Alleviating/Aggravating Factors: Zomig at onset, bed rest in a dark room, naproxen more effective than ibuprofen.  - Timing: Severe cases require bed rest; takes two tablets per episode if the first dose is ineffective after an hour.  - Severity: Intense pain resulting in tears; current medication regimen provides some relief.    Stress Headaches  They also experience intermittent stress " "headaches, characterized by a persistent dull pain that is unresponsive to hydration or sleep. These headaches, which they suspect are stress-related, can last from two days to a week.  - Onset: Intermittent.  - Duration: Can last from two days to a week.  - Character: Persistent dull pain.  - Alleviating/Aggravating Factors: Unresponsive to hydration or sleep.    Testosterone Therapy  They are interested in resuming testosterone therapy, which was previously discontinued by [facility] despite their willingness to pay out-of-pocket. They have been without insurance for approximately half a year. They were on 0.5 mL of testosterone every 2 weeks.  - Onset: Therapy previously discontinued by [facility].  - Duration: Without insurance for approximately half a year.  - Character: 0.5 mL of testosterone every 2 weeks.    Health Maintenance  They have not completed the HPV vaccine series and are considering doing so today. They recall a severe reaction to a childhood vaccine, which led to the discontinuation of subsequent doses.          Objective:     Exam:  /54   Pulse 78   Temp 36.7 °C (98.1 °F) (Temporal)   Resp 18   Ht 1.702 m (5' 7\")   Wt 65.7 kg (144 lb 12.8 oz)   LMP 04/21/2025 (Exact Date)   SpO2 97%   BMI 22.68 kg/m²  Body mass index is 22.68 kg/m².    Physical Exam  General Appearance: Normal.  Vital signs: Within normal limits.  HEENT: Within normal limits.  Respiratory: Clear to auscultation, no wheezing, rales or rhonchi.  Cardiovascular: Regular rate and rhythm, no murmurs, rubs, or gallops.  Skin: Warm and dry, no rash.  Neurological: Normal.  Physical Exam      Results        Assessment & Plan:       1. Migraine with aura and without status migrainosus, not intractable  - zolmitriptan (ZOMIG) 5 MG tablet; 1 tab at headache onset; repeat in 1 hour prn  Dispense: 9 Tablet; Refill: 11  - ondansetron (ZOFRAN ODT) 4 MG TABLET DISPERSIBLE; Take 1 Tablet by mouth every 6 hours as needed for Nausea/Vomiting.  " Dispense: 10 Tablet; Refill: 11  - naproxen (NAPROSYN) 500 MG Tab; Take 1 Tablet by mouth 1 time a day as needed (migraine).  Dispense: 30 Tablet; Refill: 0    2. Encounter for completion of form with patient    3. Hormone replacement therapy (HRT)  - Comp Metabolic Panel; Future  - CBC WITHOUT DIFFERENTIAL; Future  - TESTOSTERONE SERUM; Future  - testosterone cypionate (DEPO-TESTOSTERONE) 200 MG/ML injection; Inject 0.5 mL into the shoulder, thigh, or buttocks every 14 days for 90 days.  Dispense: 3 mL; Refill: 0  - URINE DRUG SCREEN; Future  - Controlled Substance Treatment Agreement    4. Vitamin D deficiency  - VITAMIN D,25 HYDROXY (DEFICIENCY); Future    5. Depression with anxiety  - Comp Metabolic Panel; Future  - CBC WITHOUT DIFFERENTIAL; Future    6. Chronic nonseasonal allergic rhinitis due to pollen  - CBC WITHOUT DIFFERENTIAL; Future    7. Encounter for screening for HIV  - HIV AG/AB COMBO ASSAY SCREENING; Future    8. Screening for cardiovascular condition  - Lipid Profile; Future    9. Need for vaccination  - Gardasil 9      Assessment & Plan  Migraines: Chronic.  - Experiences migraines about once or twice a month, sometimes with auras.  - Advised to take Zomig 5 mg at the onset of pain rather than during the aura phase.  - Prescription for Zomig 5 mg provided, with instructions to take one tablet at the onset of pain and another after an hour if necessary.  - Refill for Zofran provided.  - Over-the-counter supplements such as magnesium, riboflavin, and CoQ10 recommended to potentially reduce the frequency of headaches.  - Reports experiencing constant, dull headaches that last from two days to a week, which do not respond well to ibuprofen.  - Suggested to try taking Zomig 5 mg for these headaches to see if it provides relief.    Testosterone therapy.  - Expressed a desire to resume testosterone therapy, which was previously prescribed but not refilled due to insurance issues.  - Urine test will be  conducted today, and a controlled substance agreement will be signed.  - Advised to complete lab work prior to the next visit.    Health maintenance.  - Has not completed the HPV vaccine series  - Fasting labs will be ordered to monitor kidney and liver function, as well as blood count.    Follow-up  - Follow up in 3 months for testosterone refill and in 6 months for paperwork.         Return in about 3 months (around 8/8/2025), or if symptoms worsen or fail to improve, for Controlled Substance.      This note was created using voice recognition software (Dragon). The accuracy of the dictation is limited by the abilities of the software. I have reviewed the note prior to signing, however some errors in grammar and context are still possible. If you have any questions related to this note please do not hesitate to contact our office.

## 2025-05-10 LAB
AMPHET CTO UR CFM-MCNC: NEGATIVE NG/ML
BARBITURATES CTO UR CFM-MCNC: NEGATIVE NG/ML
BENZODIAZ CTO UR CFM-MCNC: NEGATIVE NG/ML
CANNABINOIDS CTO UR CFM-MCNC: NEGATIVE NG/ML
COCAINE CTO UR CFM-MCNC: NEGATIVE NG/ML
CREAT UR-MCNC: 164.8 MG/DL (ref 20–400)
DRUG COMMENT 753798: NORMAL
METHADONE CTO UR CFM-MCNC: NEGATIVE NG/ML
OPIATES CTO UR CFM-MCNC: NEGATIVE NG/ML
PCP CTO UR CFM-MCNC: NEGATIVE NG/ML
PROPOXYPH CTO UR CFM-MCNC: NEGATIVE NG/ML

## 2025-05-12 ENCOUNTER — RESULTS FOLLOW-UP (OUTPATIENT)
Dept: MEDICAL GROUP | Facility: MEDICAL CENTER | Age: 25
End: 2025-05-12
Payer: COMMERCIAL

## 2025-05-28 ENCOUNTER — HOSPITAL ENCOUNTER (OUTPATIENT)
Dept: LAB | Facility: MEDICAL CENTER | Age: 25
End: 2025-05-28
Attending: FAMILY MEDICINE
Payer: COMMERCIAL

## 2025-05-28 DIAGNOSIS — Z11.4 ENCOUNTER FOR SCREENING FOR HIV: ICD-10-CM

## 2025-05-28 DIAGNOSIS — F41.8 DEPRESSION WITH ANXIETY: ICD-10-CM

## 2025-05-28 DIAGNOSIS — Z13.6 SCREENING FOR CARDIOVASCULAR CONDITION: ICD-10-CM

## 2025-05-28 DIAGNOSIS — J30.89 CHRONIC NONSEASONAL ALLERGIC RHINITIS DUE TO POLLEN: ICD-10-CM

## 2025-05-28 DIAGNOSIS — E55.9 VITAMIN D DEFICIENCY: ICD-10-CM

## 2025-05-28 DIAGNOSIS — Z79.890 HORMONE REPLACEMENT THERAPY (HRT): ICD-10-CM

## 2025-05-28 LAB
25(OH)D3 SERPL-MCNC: 17 NG/ML (ref 30–100)
ALBUMIN SERPL BCP-MCNC: 4.3 G/DL (ref 3.2–4.9)
ALBUMIN/GLOB SERPL: 1.5 G/DL
ALP SERPL-CCNC: 52 U/L (ref 30–99)
ALT SERPL-CCNC: 11 U/L (ref 2–50)
ANION GAP SERPL CALC-SCNC: 9 MMOL/L (ref 7–16)
AST SERPL-CCNC: 14 U/L (ref 12–45)
BILIRUB SERPL-MCNC: 0.4 MG/DL (ref 0.1–1.5)
BUN SERPL-MCNC: 6 MG/DL (ref 8–22)
CALCIUM ALBUM COR SERPL-MCNC: 8.8 MG/DL (ref 8.5–10.5)
CALCIUM SERPL-MCNC: 9 MG/DL (ref 8.5–10.5)
CHLORIDE SERPL-SCNC: 105 MMOL/L (ref 96–112)
CHOLEST SERPL-MCNC: 133 MG/DL (ref 100–199)
CO2 SERPL-SCNC: 24 MMOL/L (ref 20–33)
CREAT SERPL-MCNC: 0.82 MG/DL (ref 0.5–1.4)
ERYTHROCYTE [DISTWIDTH] IN BLOOD BY AUTOMATED COUNT: 37.8 FL (ref 35.9–50)
GFR SERPLBLD CREATININE-BSD FMLA CKD-EPI: 100 ML/MIN/1.73 M 2
GLOBULIN SER CALC-MCNC: 2.8 G/DL (ref 1.9–3.5)
GLUCOSE SERPL-MCNC: 86 MG/DL (ref 65–99)
HCT VFR BLD AUTO: 39.9 % (ref 37–47)
HDLC SERPL-MCNC: 39 MG/DL
HGB BLD-MCNC: 13.6 G/DL (ref 12–16)
LDLC SERPL CALC-MCNC: 82 MG/DL
MCH RBC QN AUTO: 30.5 PG (ref 27–33)
MCHC RBC AUTO-ENTMCNC: 34.1 G/DL (ref 32.2–35.5)
MCV RBC AUTO: 89.5 FL (ref 81.4–97.8)
PLATELET # BLD AUTO: 198 K/UL (ref 164–446)
PMV BLD AUTO: 9.1 FL (ref 9–12.9)
POTASSIUM SERPL-SCNC: 4.1 MMOL/L (ref 3.6–5.5)
PROT SERPL-MCNC: 7.1 G/DL (ref 6–8.2)
RBC # BLD AUTO: 4.46 M/UL (ref 4.2–5.4)
SODIUM SERPL-SCNC: 138 MMOL/L (ref 135–145)
TESTOST SERPL-MCNC: 755 NG/DL (ref 9–75)
TRIGL SERPL-MCNC: 60 MG/DL (ref 0–149)
WBC # BLD AUTO: 4.6 K/UL (ref 4.8–10.8)

## 2025-05-28 PROCEDURE — 82306 VITAMIN D 25 HYDROXY: CPT

## 2025-05-28 PROCEDURE — 87389 HIV-1 AG W/HIV-1&-2 AB AG IA: CPT

## 2025-05-28 PROCEDURE — 84403 ASSAY OF TOTAL TESTOSTERONE: CPT

## 2025-05-28 PROCEDURE — 36415 COLL VENOUS BLD VENIPUNCTURE: CPT

## 2025-05-28 PROCEDURE — 85027 COMPLETE CBC AUTOMATED: CPT

## 2025-05-28 PROCEDURE — 80061 LIPID PANEL: CPT

## 2025-05-28 PROCEDURE — 80053 COMPREHEN METABOLIC PANEL: CPT

## 2025-05-29 ENCOUNTER — RESULTS FOLLOW-UP (OUTPATIENT)
Dept: MEDICAL GROUP | Facility: MEDICAL CENTER | Age: 25
End: 2025-05-29
Payer: COMMERCIAL

## 2025-05-29 DIAGNOSIS — E55.9 VITAMIN D DEFICIENCY: ICD-10-CM

## 2025-05-29 LAB — HIV 1+2 AB+HIV1 P24 AG SERPL QL IA: NORMAL

## 2025-05-29 RX ORDER — ERGOCALCIFEROL 1.25 MG/1
50000 CAPSULE, LIQUID FILLED ORAL
Qty: 12 CAPSULE | Refills: 1 | Status: SHIPPED | OUTPATIENT
Start: 2025-05-29

## 2025-08-07 ENCOUNTER — APPOINTMENT (OUTPATIENT)
Dept: MEDICAL GROUP | Facility: MEDICAL CENTER | Age: 25
End: 2025-08-07
Payer: COMMERCIAL

## 2025-08-07 ASSESSMENT — PATIENT HEALTH QUESTIONNAIRE - PHQ9
CLINICAL INTERPRETATION OF PHQ2 SCORE: 4
5. POOR APPETITE OR OVEREATING: 0 - NOT AT ALL
SUM OF ALL RESPONSES TO PHQ QUESTIONS 1-9: 6

## 2025-08-07 ASSESSMENT — FIBROSIS 4 INDEX: FIB4 SCORE: .5756125669211850978
